# Patient Record
Sex: FEMALE | Race: WHITE | NOT HISPANIC OR LATINO | Employment: PART TIME | URBAN - METROPOLITAN AREA
[De-identification: names, ages, dates, MRNs, and addresses within clinical notes are randomized per-mention and may not be internally consistent; named-entity substitution may affect disease eponyms.]

---

## 2017-02-16 ENCOUNTER — HOSPITAL ENCOUNTER (EMERGENCY)
Facility: HOSPITAL | Age: 47
Discharge: HOME | End: 2017-02-16
Admitting: EMERGENCY MEDICINE
Payer: COMMERCIAL

## 2017-02-16 VITALS
OXYGEN SATURATION: 100 % | SYSTOLIC BLOOD PRESSURE: 140 MMHG | TEMPERATURE: 98.3 F | DIASTOLIC BLOOD PRESSURE: 84 MMHG | WEIGHT: 135 LBS | HEART RATE: 63 BPM | RESPIRATION RATE: 18 BRPM

## 2017-02-16 DIAGNOSIS — S61.411A LACERATION OF RIGHT HAND, INITIAL ENCOUNTER: Primary | ICD-10-CM

## 2017-02-16 PROCEDURE — 90471 IMMUNIZATION ADMIN: CPT

## 2017-02-16 PROCEDURE — 99282 EMERGENCY DEPT VISIT SF MDM: CPT

## 2017-02-16 PROCEDURE — 90715 TDAP VACCINE 7 YRS/> IM: CPT | Performed by: PHYSICIAN ASSISTANT

## 2017-02-16 RX ORDER — BACITRACIN, NEOMYCIN, POLYMYXIN B 400; 3.5; 5 [USP'U]/G; MG/G; [USP'U]/G
1 OINTMENT TOPICAL ONCE
Status: COMPLETED | OUTPATIENT
Start: 2017-02-16 | End: 2017-02-16

## 2017-02-16 RX ORDER — LIDOCAINE HYDROCHLORIDE 10 MG/ML
5 INJECTION, SOLUTION EPIDURAL; INFILTRATION; INTRACAUDAL; PERINEURAL ONCE
Status: COMPLETED | OUTPATIENT
Start: 2017-02-16 | End: 2017-02-16

## 2017-02-16 RX ADMIN — LIDOCAINE HYDROCHLORIDE 5 ML: 10 INJECTION, SOLUTION EPIDURAL; INFILTRATION; INTRACAUDAL; PERINEURAL at 06:59

## 2017-02-16 RX ADMIN — TETANUS TOXOID, REDUCED DIPHTHERIA TOXOID AND ACELLULAR PERTUSSIS VACCINE, ADSORBED 0.5 ML: 5; 2.5; 8; 8; 2.5 SUSPENSION INTRAMUSCULAR at 07:39

## 2017-02-16 RX ADMIN — BACITRACIN, NEOMYCIN, POLYMYXIN B 1 SMALL APPLICATION: 400; 3.5; 5 OINTMENT TOPICAL at 06:59

## 2017-03-11 ENCOUNTER — GENERIC CONVERSION - ENCOUNTER (OUTPATIENT)
Dept: OTHER | Facility: OTHER | Age: 47
End: 2017-03-11

## 2019-10-16 ENCOUNTER — TRANSCRIBE ORDERS (OUTPATIENT)
Dept: ADMINISTRATIVE | Facility: HOSPITAL | Age: 49
End: 2019-10-16

## 2019-10-16 DIAGNOSIS — M20.21 HALLUX RIGIDUS OF RIGHT FOOT: Primary | ICD-10-CM

## 2019-10-23 ENCOUNTER — HOSPITAL ENCOUNTER (OUTPATIENT)
Dept: MRI IMAGING | Facility: HOSPITAL | Age: 49
Discharge: HOME | End: 2019-10-23
Attending: PODIATRIST
Payer: COMMERCIAL

## 2019-10-23 DIAGNOSIS — M20.21 HALLUX RIGIDUS OF RIGHT FOOT: ICD-10-CM

## 2019-10-23 PROCEDURE — 73718 MRI LOWER EXTREMITY W/O DYE: CPT

## 2020-08-12 ENCOUNTER — OFFICE VISIT (OUTPATIENT)
Dept: PODIATRY | Facility: CLINIC | Age: 50
End: 2020-08-12
Payer: COMMERCIAL

## 2020-08-12 VITALS — BODY MASS INDEX: 20.16 KG/M2 | WEIGHT: 133 LBS | HEIGHT: 68 IN

## 2020-08-12 DIAGNOSIS — M19.071 PRIMARY OSTEOARTHRITIS OF RIGHT FOOT: ICD-10-CM

## 2020-08-12 DIAGNOSIS — M20.5X1 HALLUX LIMITUS OF RIGHT FOOT: Primary | ICD-10-CM

## 2020-08-12 PROCEDURE — 99203 OFFICE O/P NEW LOW 30 MIN: CPT | Performed by: PODIATRIST

## 2020-08-12 NOTE — PROGRESS NOTES
Assessment/Plan:     Diagnoses and all orders for this visit:    Hallux limitus of right foot  -     Case request operating room: CHEILECTOMY first MTPJ; Standing  -     Case request operating room: CHEILECTOMY first MTPJ    Primary osteoarthritis of right foot  -     Case request operating room: CHEILECTOMY first MTPJ; Standing  -     Case request operating room: CHEILECTOMY first MTPJ    Other orders  -     Incentive spirometry; Standing  -     Insert and maintain IV line; Standing  -     Void On-Call to O R ; Standing  -     ceFAZolin (ANCEF) 1,000 mg in dextrose 5 % 100 mL IVPB      patient has moderate osteoarthritis of the 1st metatarsophalangeal joint of the right foot  Conservative and surgical options discussed  Given the amount of pain and discomfort she has been having as well as the fact she has tried numerous conservative measures to relieve her pain I feel are best option moving forward would be surgical intervention  Described a cheilectomy procedure in detail and what it would entail  Reviewed her MRI findings showing a constrained joint with spurring and marrow edema  Alternatives, risks, complications discussed  Consent was signed for cheilectomy right 1st metatarsophalangeal joint  Postoperative course was discussed as can best be predicted  Patient should plan for 1 month off work but may be able to return sooner  Surgery is planned for early September  Subjective:      Patient ID: Lina Ponce is a 44 y o  female  Patient is a distance runner  She has never had a problem with her before  LAst summer she started getting pain at the base of the great toe on the right  She thought it may be a stress fracture  It would only hurt after an hour of running, around 7-8 miles  As soon as she stopped running it would HURT A LOT  IT only mildly swells  She has been trying to cut down on her miles  She saw a podiatrist who did not feel it was a stress fracture   She called it arthritis  She got an MRI which did NOT show a stress fracture either  The following portions of the patient's history were reviewed and updated as appropriate: allergies, current medications, past family history, past medical history, past social history, past surgical history and problem list     Review of Systems   Constitutional: Negative  HENT: Negative for sinus pressure and sinus pain  Respiratory: Negative for cough and shortness of breath  Cardiovascular: Negative for leg swelling  Gastrointestinal: Negative for diarrhea and nausea  Musculoskeletal: Positive for arthralgias and joint swelling  Negative for gait problem  Skin: Negative for color change, rash and wound  Neurological: Negative for numbness  Objective:      Ht 5' 8" (1 727 m)   Wt 60 3 kg (133 lb)   BMI 20 22 kg/m²          Physical Exam    Vitals reviewed    Constitutional: Patient is not distressed  Patient is well developed  Vascular: Dorsalis pedis and posterior tibial pulses palpable  Capillary refill time within normal limits to all digits  No erythema  No edema  No significant varicosities  Dermatology: No rash  No open lesions  Present pedal hair  Skin has healthy turgor  Musculoskeletal: Normal range of motion to ankle, subtalar joint, and midtarsal joint  Decreased range of motion first MTPJ  Functionally patient has less than 5° dorsiflexion during stance with moderate to severe spurring around the 1st metatarsophalangeal joint  Passively I find no crepitus with range of motion but again dorsiflexion is limited due to abutment of bone  Manual muscle testing 5 out of 5 for inversion/eversion/dorsiflexion/plantarflexion  On stance patients feet show moderate collapsing pes planovalgus without ankle or proceed tibial tendon pain  Mild calcaneal valgus noted on stance  Normal inversion during heel raise  Neurological exam: Monofilament sensation is intact  Vibratory sensation is intact  Achilles reflex is normal  Patient is AAOx3  MRI from last year shows moderate marrow edema to the first metatarsal head  Thinning joint space MTPJ first ray

## 2020-08-14 ENCOUNTER — APPOINTMENT (OUTPATIENT)
Dept: LAB | Facility: MEDICAL CENTER | Age: 50
End: 2020-08-14
Payer: COMMERCIAL

## 2020-08-14 DIAGNOSIS — Z01.818 PRE-OP TESTING: ICD-10-CM

## 2020-08-14 DIAGNOSIS — M20.5X1 HALLUX LIMITUS OF RIGHT FOOT: ICD-10-CM

## 2020-08-14 LAB
ANION GAP SERPL CALCULATED.3IONS-SCNC: 7 MMOL/L (ref 4–13)
BASOPHILS # BLD AUTO: 0.05 THOUSANDS/ΜL (ref 0–0.1)
BASOPHILS NFR BLD AUTO: 1 % (ref 0–1)
BUN SERPL-MCNC: 8 MG/DL (ref 5–25)
CALCIUM SERPL-MCNC: 9.1 MG/DL (ref 8.3–10.1)
CHLORIDE SERPL-SCNC: 106 MMOL/L (ref 100–108)
CO2 SERPL-SCNC: 28 MMOL/L (ref 21–32)
CREAT SERPL-MCNC: 0.73 MG/DL (ref 0.6–1.3)
EOSINOPHIL # BLD AUTO: 0.05 THOUSAND/ΜL (ref 0–0.61)
EOSINOPHIL NFR BLD AUTO: 1 % (ref 0–6)
ERYTHROCYTE [DISTWIDTH] IN BLOOD BY AUTOMATED COUNT: 13.2 % (ref 11.6–15.1)
GFR SERPL CREATININE-BSD FRML MDRD: 96 ML/MIN/1.73SQ M
GLUCOSE P FAST SERPL-MCNC: 88 MG/DL (ref 65–99)
HCT VFR BLD AUTO: 42.9 % (ref 34.8–46.1)
HGB BLD-MCNC: 13.8 G/DL (ref 11.5–15.4)
IMM GRANULOCYTES # BLD AUTO: 0.01 THOUSAND/UL (ref 0–0.2)
IMM GRANULOCYTES NFR BLD AUTO: 0 % (ref 0–2)
LYMPHOCYTES # BLD AUTO: 1.82 THOUSANDS/ΜL (ref 0.6–4.47)
LYMPHOCYTES NFR BLD AUTO: 44 % (ref 14–44)
MCH RBC QN AUTO: 32.3 PG (ref 26.8–34.3)
MCHC RBC AUTO-ENTMCNC: 32.2 G/DL (ref 31.4–37.4)
MCV RBC AUTO: 101 FL (ref 82–98)
MONOCYTES # BLD AUTO: 0.4 THOUSAND/ΜL (ref 0.17–1.22)
MONOCYTES NFR BLD AUTO: 10 % (ref 4–12)
NEUTROPHILS # BLD AUTO: 1.81 THOUSANDS/ΜL (ref 1.85–7.62)
NEUTS SEG NFR BLD AUTO: 44 % (ref 43–75)
NRBC BLD AUTO-RTO: 0 /100 WBCS
PLATELET # BLD AUTO: 251 THOUSANDS/UL (ref 149–390)
PMV BLD AUTO: 10.5 FL (ref 8.9–12.7)
POTASSIUM SERPL-SCNC: 3.9 MMOL/L (ref 3.5–5.3)
RBC # BLD AUTO: 4.27 MILLION/UL (ref 3.81–5.12)
SODIUM SERPL-SCNC: 141 MMOL/L (ref 136–145)
WBC # BLD AUTO: 4.14 THOUSAND/UL (ref 4.31–10.16)

## 2020-08-14 PROCEDURE — 85025 COMPLETE CBC W/AUTO DIFF WBC: CPT

## 2020-08-14 PROCEDURE — 36415 COLL VENOUS BLD VENIPUNCTURE: CPT

## 2020-08-14 PROCEDURE — 80048 BASIC METABOLIC PNL TOTAL CA: CPT

## 2020-08-19 ENCOUNTER — CONSULT (OUTPATIENT)
Dept: FAMILY MEDICINE CLINIC | Facility: CLINIC | Age: 50
End: 2020-08-19
Payer: COMMERCIAL

## 2020-08-19 VITALS
RESPIRATION RATE: 18 BRPM | WEIGHT: 131 LBS | OXYGEN SATURATION: 99 % | BODY MASS INDEX: 19.85 KG/M2 | TEMPERATURE: 98.7 F | HEART RATE: 65 BPM | DIASTOLIC BLOOD PRESSURE: 70 MMHG | HEIGHT: 68 IN | SYSTOLIC BLOOD PRESSURE: 130 MMHG

## 2020-08-19 DIAGNOSIS — Z01.818 PRE-OP EXAMINATION: Primary | ICD-10-CM

## 2020-08-19 PROCEDURE — 3725F SCREEN DEPRESSION PERFORMED: CPT | Performed by: FAMILY MEDICINE

## 2020-08-19 PROCEDURE — 99243 OFF/OP CNSLTJ NEW/EST LOW 30: CPT | Performed by: FAMILY MEDICINE

## 2020-08-19 PROCEDURE — 1036F TOBACCO NON-USER: CPT | Performed by: FAMILY MEDICINE

## 2020-08-19 PROCEDURE — 3008F BODY MASS INDEX DOCD: CPT | Performed by: FAMILY MEDICINE

## 2020-08-19 NOTE — H&P (VIEW-ONLY)
Assessment/Plan:  Patient is cleared for surgery  We also discussed mammography and colon cancer screening but these were deferred by patient  Lab testing was reviewed  EKG offered but deferred as well  Recommend annual testing  She is exercising regularly  No problem-specific Assessment & Plan notes found for this encounter  There are no diagnoses linked to this encounter  Subjective:      Patient ID: Celeste Jerome is a 44 y o  female  Patient is here for preoperative clearance for foot surgery  She is having foot surgery in the next few weeks  No prior difficulty with anesthesia  Currently on no medications  She had lab testing done that was normal       The following portions of the patient's history were reviewed and updated as appropriate: allergies, current medications, past family history, past medical history, past social history, past surgical history and problem list     Review of Systems   Constitutional: Negative  HENT: Negative  Eyes: Negative  Respiratory: Negative  Cardiovascular: Negative  Gastrointestinal: Negative  Endocrine: Negative  Genitourinary: Negative  Musculoskeletal: Positive for arthralgias ( foot pain)  Skin: Negative  Allergic/Immunologic: Negative  Neurological: Negative  Hematological: Negative  Psychiatric/Behavioral: Negative  Objective:      /70 (BP Location: Left arm, Patient Position: Sitting, Cuff Size: Adult)   Pulse 65   Temp 98 7 °F (37 1 °C)   Resp 18   Ht 5' 8" (1 727 m)   Wt 59 4 kg (131 lb)   SpO2 99%   BMI 19 92 kg/m²          Physical Exam  Vitals signs reviewed  Constitutional:       Appearance: She is well-developed  HENT:      Head: Normocephalic and atraumatic  Right Ear: External ear normal  Tympanic membrane is not erythematous or bulging  Left Ear: External ear normal  Tympanic membrane is not erythematous or bulging        Nose: Nose normal  Mouth/Throat:      Mouth: No oral lesions  Pharynx: No oropharyngeal exudate  Eyes:      General: No scleral icterus  Right eye: No discharge  Left eye: No discharge  Conjunctiva/sclera: Conjunctivae normal    Neck:      Musculoskeletal: Normal range of motion and neck supple  Thyroid: No thyromegaly  Cardiovascular:      Rate and Rhythm: Normal rate and regular rhythm  Heart sounds: Normal heart sounds  No murmur  No friction rub  No gallop  Pulmonary:      Effort: Pulmonary effort is normal  No respiratory distress  Breath sounds: No wheezing or rales  Chest:      Chest wall: No tenderness  Abdominal:      General: Bowel sounds are normal  There is no distension  Palpations: Abdomen is soft  There is no mass  Tenderness: There is no abdominal tenderness  There is no guarding or rebound  Musculoskeletal: Normal range of motion  General: No tenderness or deformity  Lymphadenopathy:      Cervical: No cervical adenopathy  Skin:     General: Skin is warm and dry  Coloration: Skin is not pale  Findings: No erythema or rash  Neurological:      Mental Status: She is alert and oriented to person, place, and time  Cranial Nerves: No cranial nerve deficit  Motor: No abnormal muscle tone  Coordination: Coordination normal       Deep Tendon Reflexes: Reflexes are normal and symmetric     Psychiatric:         Behavior: Behavior normal

## 2020-08-19 NOTE — PROGRESS NOTES
Assessment/Plan:  Patient is cleared for surgery  We also discussed mammography and colon cancer screening but these were deferred by patient  Lab testing was reviewed  EKG offered but deferred as well  Recommend annual testing  She is exercising regularly  No problem-specific Assessment & Plan notes found for this encounter  There are no diagnoses linked to this encounter  Subjective:      Patient ID: Abilio Maldonado is a 44 y o  female  Patient is here for preoperative clearance for foot surgery  She is having foot surgery in the next few weeks  No prior difficulty with anesthesia  Currently on no medications  She had lab testing done that was normal       The following portions of the patient's history were reviewed and updated as appropriate: allergies, current medications, past family history, past medical history, past social history, past surgical history and problem list     Review of Systems   Constitutional: Negative  HENT: Negative  Eyes: Negative  Respiratory: Negative  Cardiovascular: Negative  Gastrointestinal: Negative  Endocrine: Negative  Genitourinary: Negative  Musculoskeletal: Positive for arthralgias ( foot pain)  Skin: Negative  Allergic/Immunologic: Negative  Neurological: Negative  Hematological: Negative  Psychiatric/Behavioral: Negative  Objective:      /70 (BP Location: Left arm, Patient Position: Sitting, Cuff Size: Adult)   Pulse 65   Temp 98 7 °F (37 1 °C)   Resp 18   Ht 5' 8" (1 727 m)   Wt 59 4 kg (131 lb)   SpO2 99%   BMI 19 92 kg/m²          Physical Exam  Vitals signs reviewed  Constitutional:       Appearance: She is well-developed  HENT:      Head: Normocephalic and atraumatic  Right Ear: External ear normal  Tympanic membrane is not erythematous or bulging  Left Ear: External ear normal  Tympanic membrane is not erythematous or bulging        Nose: Nose normal  Mouth/Throat:      Mouth: No oral lesions  Pharynx: No oropharyngeal exudate  Eyes:      General: No scleral icterus  Right eye: No discharge  Left eye: No discharge  Conjunctiva/sclera: Conjunctivae normal    Neck:      Musculoskeletal: Normal range of motion and neck supple  Thyroid: No thyromegaly  Cardiovascular:      Rate and Rhythm: Normal rate and regular rhythm  Heart sounds: Normal heart sounds  No murmur  No friction rub  No gallop  Pulmonary:      Effort: Pulmonary effort is normal  No respiratory distress  Breath sounds: No wheezing or rales  Chest:      Chest wall: No tenderness  Abdominal:      General: Bowel sounds are normal  There is no distension  Palpations: Abdomen is soft  There is no mass  Tenderness: There is no abdominal tenderness  There is no guarding or rebound  Musculoskeletal: Normal range of motion  General: No tenderness or deformity  Lymphadenopathy:      Cervical: No cervical adenopathy  Skin:     General: Skin is warm and dry  Coloration: Skin is not pale  Findings: No erythema or rash  Neurological:      Mental Status: She is alert and oriented to person, place, and time  Cranial Nerves: No cranial nerve deficit  Motor: No abnormal muscle tone  Coordination: Coordination normal       Deep Tendon Reflexes: Reflexes are normal and symmetric     Psychiatric:         Behavior: Behavior normal

## 2020-08-21 ENCOUNTER — ANESTHESIA EVENT (OUTPATIENT)
Dept: PERIOP | Facility: AMBULARY SURGERY CENTER | Age: 50
End: 2020-08-21
Payer: COMMERCIAL

## 2020-09-01 NOTE — PRE-PROCEDURE INSTRUCTIONS
No outpatient medications have been marked as taking for the 9/3/20 encounter [facility] Encounter)  All pre-op instructions provided w/ pt verb understanding including showering instructions - pt takes no pre-op meds

## 2020-09-03 ENCOUNTER — APPOINTMENT (OUTPATIENT)
Dept: RADIOLOGY | Facility: AMBULARY SURGERY CENTER | Age: 50
End: 2020-09-03
Payer: COMMERCIAL

## 2020-09-03 ENCOUNTER — ANESTHESIA (OUTPATIENT)
Dept: PERIOP | Facility: AMBULARY SURGERY CENTER | Age: 50
End: 2020-09-03
Payer: COMMERCIAL

## 2020-09-03 ENCOUNTER — HOSPITAL ENCOUNTER (OUTPATIENT)
Facility: AMBULARY SURGERY CENTER | Age: 50
Setting detail: OUTPATIENT SURGERY
Discharge: HOME | End: 2020-09-03
Attending: PODIATRIST | Admitting: PODIATRIST
Payer: COMMERCIAL

## 2020-09-03 VITALS — HEART RATE: 50 BPM

## 2020-09-03 VITALS
WEIGHT: 128 LBS | OXYGEN SATURATION: 98 % | TEMPERATURE: 98 F | HEART RATE: 55 BPM | RESPIRATION RATE: 18 BRPM | BODY MASS INDEX: 19.4 KG/M2 | DIASTOLIC BLOOD PRESSURE: 71 MMHG | HEIGHT: 68 IN | SYSTOLIC BLOOD PRESSURE: 109 MMHG

## 2020-09-03 DIAGNOSIS — Z98.890 S/P FOOT SURGERY: Primary | ICD-10-CM

## 2020-09-03 LAB
EXT PREGNANCY TEST URINE: NEGATIVE
EXT. CONTROL: NORMAL

## 2020-09-03 PROCEDURE — 99024 POSTOP FOLLOW-UP VISIT: CPT | Performed by: PODIATRIST

## 2020-09-03 PROCEDURE — 81025 URINE PREGNANCY TEST: CPT | Performed by: ANESTHESIOLOGY

## 2020-09-03 PROCEDURE — 28289 CORRJ HALUX RIGDUS W/O IMPLT: CPT | Performed by: PODIATRIST

## 2020-09-03 PROCEDURE — 73630 X-RAY EXAM OF FOOT: CPT

## 2020-09-03 RX ORDER — SODIUM CHLORIDE, SODIUM LACTATE, POTASSIUM CHLORIDE, CALCIUM CHLORIDE 600; 310; 30; 20 MG/100ML; MG/100ML; MG/100ML; MG/100ML
20 INJECTION, SOLUTION INTRAVENOUS CONTINUOUS
Status: DISCONTINUED | OUTPATIENT
Start: 2020-09-03 | End: 2020-09-03 | Stop reason: HOSPADM

## 2020-09-03 RX ORDER — BUPIVACAINE HYDROCHLORIDE 5 MG/ML
INJECTION, SOLUTION PERINEURAL AS NEEDED
Status: DISCONTINUED | OUTPATIENT
Start: 2020-09-03 | End: 2020-09-03 | Stop reason: HOSPADM

## 2020-09-03 RX ORDER — PROPOFOL 10 MG/ML
INJECTION, EMULSION INTRAVENOUS AS NEEDED
Status: DISCONTINUED | OUTPATIENT
Start: 2020-09-03 | End: 2020-09-03

## 2020-09-03 RX ORDER — OXYCODONE HYDROCHLORIDE AND ACETAMINOPHEN 5; 325 MG/1; MG/1
1 TABLET ORAL EVERY 4 HOURS PRN
Qty: 15 TABLET | Refills: 0 | Status: SHIPPED | OUTPATIENT
Start: 2020-09-03 | End: 2020-09-08

## 2020-09-03 RX ORDER — CEFAZOLIN SODIUM 1 G/50ML
1000 SOLUTION INTRAVENOUS EVERY 8 HOURS
Status: DISCONTINUED | OUTPATIENT
Start: 2020-09-03 | End: 2020-09-03 | Stop reason: HOSPADM

## 2020-09-03 RX ORDER — MAGNESIUM HYDROXIDE 1200 MG/15ML
LIQUID ORAL AS NEEDED
Status: DISCONTINUED | OUTPATIENT
Start: 2020-09-03 | End: 2020-09-03 | Stop reason: HOSPADM

## 2020-09-03 RX ORDER — FENTANYL CITRATE 50 UG/ML
INJECTION, SOLUTION INTRAMUSCULAR; INTRAVENOUS AS NEEDED
Status: DISCONTINUED | OUTPATIENT
Start: 2020-09-03 | End: 2020-09-03

## 2020-09-03 RX ORDER — KETOROLAC TROMETHAMINE 30 MG/ML
30 INJECTION, SOLUTION INTRAMUSCULAR; INTRAVENOUS ONCE
Status: COMPLETED | OUTPATIENT
Start: 2020-09-03 | End: 2020-09-03

## 2020-09-03 RX ORDER — ACETAMINOPHEN 325 MG/1
650 TABLET ORAL EVERY 6 HOURS PRN
Status: DISCONTINUED | OUTPATIENT
Start: 2020-09-03 | End: 2020-09-03 | Stop reason: HOSPADM

## 2020-09-03 RX ORDER — DEXAMETHASONE SODIUM PHOSPHATE 4 MG/ML
INJECTION, SOLUTION INTRA-ARTICULAR; INTRALESIONAL; INTRAMUSCULAR; INTRAVENOUS; SOFT TISSUE AS NEEDED
Status: DISCONTINUED | OUTPATIENT
Start: 2020-09-03 | End: 2020-09-03

## 2020-09-03 RX ORDER — LIDOCAINE HYDROCHLORIDE 10 MG/ML
INJECTION, SOLUTION EPIDURAL; INFILTRATION; INTRACAUDAL; PERINEURAL AS NEEDED
Status: DISCONTINUED | OUTPATIENT
Start: 2020-09-03 | End: 2020-09-03 | Stop reason: HOSPADM

## 2020-09-03 RX ORDER — ONDANSETRON 2 MG/ML
4 INJECTION INTRAMUSCULAR; INTRAVENOUS ONCE AS NEEDED
Status: DISCONTINUED | OUTPATIENT
Start: 2020-09-03 | End: 2020-09-03 | Stop reason: HOSPADM

## 2020-09-03 RX ORDER — SODIUM CHLORIDE, SODIUM LACTATE, POTASSIUM CHLORIDE, CALCIUM CHLORIDE 600; 310; 30; 20 MG/100ML; MG/100ML; MG/100ML; MG/100ML
125 INJECTION, SOLUTION INTRAVENOUS CONTINUOUS
Status: DISCONTINUED | OUTPATIENT
Start: 2020-09-03 | End: 2020-09-03 | Stop reason: HOSPADM

## 2020-09-03 RX ORDER — ONDANSETRON 2 MG/ML
INJECTION INTRAMUSCULAR; INTRAVENOUS AS NEEDED
Status: DISCONTINUED | OUTPATIENT
Start: 2020-09-03 | End: 2020-09-03

## 2020-09-03 RX ORDER — MIDAZOLAM HYDROCHLORIDE 2 MG/2ML
INJECTION, SOLUTION INTRAMUSCULAR; INTRAVENOUS AS NEEDED
Status: DISCONTINUED | OUTPATIENT
Start: 2020-09-03 | End: 2020-09-03

## 2020-09-03 RX ORDER — FENTANYL CITRATE/PF 50 MCG/ML
25 SYRINGE (ML) INJECTION
Status: DISCONTINUED | OUTPATIENT
Start: 2020-09-03 | End: 2020-09-03 | Stop reason: HOSPADM

## 2020-09-03 RX ORDER — OXYCODONE HYDROCHLORIDE AND ACETAMINOPHEN 5; 325 MG/1; MG/1
1 TABLET ORAL EVERY 4 HOURS PRN
Status: DISCONTINUED | OUTPATIENT
Start: 2020-09-03 | End: 2020-09-03 | Stop reason: HOSPADM

## 2020-09-03 RX ORDER — PROPOFOL 10 MG/ML
INJECTION, EMULSION INTRAVENOUS CONTINUOUS PRN
Status: DISCONTINUED | OUTPATIENT
Start: 2020-09-03 | End: 2020-09-03

## 2020-09-03 RX ADMIN — PROPOFOL 80 MG: 10 INJECTION, EMULSION INTRAVENOUS at 10:53

## 2020-09-03 RX ADMIN — FENTANYL CITRATE 50 MCG: 50 INJECTION, SOLUTION INTRAMUSCULAR; INTRAVENOUS at 10:53

## 2020-09-03 RX ADMIN — PROPOFOL 75 MCG/KG/MIN: 10 INJECTION, EMULSION INTRAVENOUS at 10:53

## 2020-09-03 RX ADMIN — MIDAZOLAM HYDROCHLORIDE 2 MG: 1 INJECTION, SOLUTION INTRAMUSCULAR; INTRAVENOUS at 10:56

## 2020-09-03 RX ADMIN — ACETAMINOPHEN 650 MG: 325 TABLET, FILM COATED ORAL at 12:00

## 2020-09-03 RX ADMIN — SODIUM CHLORIDE, SODIUM LACTATE, POTASSIUM CHLORIDE, AND CALCIUM CHLORIDE: .6; .31; .03; .02 INJECTION, SOLUTION INTRAVENOUS at 10:38

## 2020-09-03 RX ADMIN — DEXAMETHASONE SODIUM PHOSPHATE 4 MG: 4 INJECTION, SOLUTION INTRAMUSCULAR; INTRAVENOUS at 10:53

## 2020-09-03 RX ADMIN — FENTANYL CITRATE 25 MCG: 50 INJECTION, SOLUTION INTRAMUSCULAR; INTRAVENOUS at 11:05

## 2020-09-03 RX ADMIN — KETOROLAC TROMETHAMINE 30 MG: 30 INJECTION, SOLUTION INTRAMUSCULAR at 12:19

## 2020-09-03 RX ADMIN — ONDANSETRON 4 MG: 2 INJECTION INTRAMUSCULAR; INTRAVENOUS at 10:56

## 2020-09-03 RX ADMIN — FENTANYL CITRATE 25 MCG: 50 INJECTION, SOLUTION INTRAMUSCULAR; INTRAVENOUS at 10:59

## 2020-09-03 RX ADMIN — CEFAZOLIN SODIUM 1000 MG: 1 SOLUTION INTRAVENOUS at 10:45

## 2020-09-03 NOTE — OP NOTE
OPERATIVE REPORT  PATIENT NAME: Edilma Kendrick    :  1970  MRN: [id number]  Pt Location: [facility] OR ROOM 04    SURGERY DATE: 9/3/2020    Surgeon(s) and Role:     * Garland Bence, DPM - Primary     * Royal Davis DPM - Assisting    Preop Diagnosis:  Hallux limitus of right foot [M20 5X1]  Primary osteoarthritis of right foot [M19 071]    Post-Op Diagnosis Codes:     * Hallux limitus of right foot [M20 5X1]     * Primary osteoarthritis of right foot [M19 071]    Procedure(s) (LRB):  CHEILECTOMY first MTPJ (Right)    Specimen(s):  * No specimens in log *    Estimated Blood Loss:   Minimal    Drains:  * No LDAs found *    Anesthesia Type:   IV Sedation with Anesthesia    Operative Indications:  Hallux limitus of right foot [M20 5X1]  Primary osteoarthritis of right foot [M19 071]      Operative Findings:  Consistent with diagnosis  There is a small 0 5 x 0 5 cm area on the dorsal lateral corner of the metatarsal head where the cartilage was found to be denuded  Complications:   None    Procedure and Technique:  Under mild sedation the patient was brought into the operating room and placed on the operating table in a supine position  Pneumatic ankle tourniquet was placed about the right ankle  Following IV sedation a time-out was performed  10 cc of local anesthetic was infiltrated in a Melendez block fashion about the right 1st ray  The right foot was then scrubbed prepped draped in the usual aseptic manner  The tourniquet was inflated to 250 mm of pressure    Attention was then directed to the 1st ray where a linear incision was made dorsal to the 1st metatarsophalangeal joint approximately 5 cm  Sharp and blunt dissection continued down to the level of the capsule with care being taken to retract all vital neurovascular structures  Any small veins were cauterized    A linear dorsal incision was made to open the metatarsophalangeal joint capsule and all soft tissue attachments reflected off the metatarsal head and base of proximal phalanx  The joint was inspected  There was a small 0 5 x 0 5 cm area on the dorsal lateral aspect of the metatarsal head were cartilage was denuded  There was an even smaller lesion at the same area of the base of the proximal phalanx  This was cleaned but did not represent a significant amount of the articular surface  There was extensive amount of joint spurring dorsally laterally and medially of both metatarsal head and proximal phalanx  A sagittal saw was used to resect all the spurring off the metatarsal head  A rongeur and saw were used to resect any spurring from the base of the proximal phalanx  Following removal of the spurring the toe had anatomic range of motion of at least 60° dorsiflexion and 15° plantar flexion  The joint was rinsed with copious amounts normal sterile saline  The joint capsule was repaired with 3 0 Vicryl  Subcu closure with 4 O Vicryl  The skin was repaired with 4 O nylon  The foot was then cleaned and dressed  The tourniquet was deflated  A prompt hyperemic response was noted to all digits on the right foot  The patient emerged from anesthesia having tolerated the procedure well  She was transferred to PACU vital signs stable     I was present for the entire procedure    Patient Disposition:  PACU  and hemodynamically stable    SIGNATURE: Rica Blue DPM  DATE: September 3, 2020  TIME: 11:23 AM

## 2020-09-03 NOTE — INTERVAL H&P NOTE
H&P reviewed  After examining the patient I find no changes in the patients condition since the H&P had been written      Vitals:    09/03/20 1016   BP: 135/72   Pulse: 80   Resp: 18   Temp: 98 °F (36 7 °C)   SpO2: 98%

## 2020-09-03 NOTE — ANESTHESIA POSTPROCEDURE EVALUATION
Post-Op Assessment Note    CV Status:  Stable  Pain Score: 0    Pain management: adequate     Mental Status:  Alert   Hydration Status:  Stable and euvolemic   PONV Controlled:  None   Airway Patency:  Patent      Post Op Vitals Reviewed: Yes      Staff: CRNA   Comments: vss- report to PACU  RN        No complications documented      BP   102/58   Temp 97 3   Pulse 48   Resp 18   SpO2 99

## 2020-09-03 NOTE — DISCHARGE SUMMARY
Discharge Summary Outpatient Procedure Podiatry -   Ritesh Crisostomo 44 y o  female MRN: [id number]  Unit/Bed#: [facility] Encounter: [id number]    Admission Date: 9/3/2020     Admitting Diagnosis: Hallux limitus of right foot [M20 5X1]  Primary osteoarthritis of right foot [M19 071]    Discharge Diagnosis: same    Procedures Performed: CHEILECTOMY first MTPJ: 17557 (CPT®)    Complications: none    Condition at Discharge: stable    Discharge instructions/Information to patient and family:   See after visit summary for information provided to patient and family  Provisions for Follow-Up Care/Important appointments:  See after visit summary for information related to follow-up care and any pertinent home health orders  Discharge Medications:  See after visit summary for reconciled discharge medications provided to patient and family

## 2020-09-03 NOTE — DISCHARGE INSTRUCTIONS
Dr El Callejas Instructions    1  Take your prescribed medication as directed  2  Upon arrival at home, lie down and elevate your surgical foot on 2 pillows  3  Stay off your feet as much as possible for the first 24-48 hours  This is when your feet first swell and may become painful  After 48 hours you may begin limited walking following these restrictions:   Weight-bearing as tolerated  4  Drink large quantities of water  Consume no alcohol  Continue a well-balanced diet  5  Report any unusual discomfort or fever to this office  6  A limited amount of discomfort and swelling is to be expected  In some cases the skin may take on a bruised appearance  The surgical cleansing solution that was applied to your foot prior to the operation is dark in color and the operation site may appear to be oozing when it actually is not  7  A slight amount of blood is to be expected, and is no cause for alarm  Do not remove the dressings  If there is active bleeding and if the bleeding persists, add additional gauze to the bandage, apply direct pressure, elevate your feet and call this office  8  Do not get the dressings wet  As regular bathing may be inconvenient, sponge baths are recommended  9  When anesthesia wears off and if any discomfort should be present, apply an ice pack directly over the operated area for 15 minute intervals for several hours or until the pain leaves  (USE IN EXCESS OF 15 MINUTES COULD CAUSE FROSTBITE)  Do not use hot water bags or electric pads  A convenient icepack can be made by placing ice cubes in a plastic bag and covering this with a towel  10  Take over-the-counter laxative for constipation, this is common with use of narcotic medications

## 2020-09-03 NOTE — ANESTHESIA PREPROCEDURE EVALUATION
Procedure:  CHEILECTOMY first MTPJ (Right Foot)    Relevant Problems   ANESTHESIA (within normal limits)      CARDIO (within normal limits)      ENDO (within normal limits)      GI/HEPATIC (within normal limits)      /RENAL (within normal limits)      GYN (within normal limits)      HEMATOLOGY (within normal limits)      MUSCULOSKELETAL (within normal limits)      NEURO/PSYCH (within normal limits)      PULMONARY (within normal limits)        Physical Exam    Airway    Mallampati score: III  TM Distance: >3 FB  Neck ROM: full     Dental   No notable dental hx     Cardiovascular      Pulmonary      Other Findings        Anesthesia Plan  ASA Score- 1     Anesthesia Type- IV sedation with anesthesia with ASA Monitors  Additional Monitors:   Airway Plan:           Plan Factors-    Chart reviewed  Existing labs reviewed  Patient summary reviewed  Induction- intravenous  Postoperative Plan- Plan for postoperative opioid use  Informed Consent- Anesthetic plan and risks discussed with patient  I personally reviewed this patient with the CRNA  Discussed and agreed on the Anesthesia Plan with the CRNA  April De Souza

## 2020-09-09 ENCOUNTER — OFFICE VISIT (OUTPATIENT)
Dept: PODIATRY | Facility: CLINIC | Age: 50
End: 2020-09-09

## 2020-09-09 VITALS — HEIGHT: 68 IN | WEIGHT: 128 LBS | BODY MASS INDEX: 19.4 KG/M2

## 2020-09-09 DIAGNOSIS — M20.5X1 HALLUX LIMITUS OF RIGHT FOOT: Primary | ICD-10-CM

## 2020-09-09 DIAGNOSIS — Z09 POSTOP CHECK: ICD-10-CM

## 2020-09-09 PROCEDURE — 99024 POSTOP FOLLOW-UP VISIT: CPT | Performed by: PODIATRIST

## 2020-09-09 NOTE — LETTER
September 9, 2020     Patient: Janet Mayberry   YOB: 1970   Date of Visit: 9/9/2020       To Whom it May Concern:    Skye Nation is under my professional care  She was seen in my office on 9/9/2020  She  Had a surgical procedure on her right foot 9/3/2020  She will be out of work for 2-4 weeks postop  I will re-evaluate in 1 week (which is 2 weeks postop), to determine if she can return in 2 or 4 weeks  If you have any questions or concerns, please don't hesitate to call  Sincerely,          Rica Blue DPM        CC: Stepan Mayo

## 2020-09-09 NOTE — PROGRESS NOTES
Assessment/Plan:      Diagnoses and all orders for this visit:    Hallux limitus of right foot    Postop check       Patient recovering well  Incision is stable  Suture removal in 1 week  Weight bear in surgical shoe  Daily passive range of motion exercises discussed with patient  Subjective:     Patient ID: Edilma Kendrick is a 44 y o  female  Patient is 1 week status post cheilectomy of her right great toe  She is feeling well  Minimal pain today  Dressing is clean dry and intact  Review of Systems   Constitutional: Negative  Musculoskeletal: Positive for arthralgias  Objective:     Physical Exam      Right lower extremities exam shows neurovascular status intact to the right foot  Incision stable without sign of infection  Expected postoperative ecchymosis and edema  No sign of infection  Extensor and flexor hallucis longus intact and palpated  No crepitus with 1st MTPJ passive range of motion

## 2020-10-14 ENCOUNTER — OFFICE VISIT (OUTPATIENT)
Dept: PODIATRY | Facility: CLINIC | Age: 50
End: 2020-10-14

## 2020-10-14 VITALS — BODY MASS INDEX: 19.4 KG/M2 | HEIGHT: 68 IN | WEIGHT: 128 LBS

## 2020-10-14 DIAGNOSIS — Z09 POSTOP CHECK: ICD-10-CM

## 2020-10-14 DIAGNOSIS — M20.5X1 HALLUX LIMITUS OF RIGHT FOOT: Primary | ICD-10-CM

## 2020-10-14 PROCEDURE — 99024 POSTOP FOLLOW-UP VISIT: CPT | Performed by: PODIATRIST

## 2020-12-02 ENCOUNTER — OFFICE VISIT (OUTPATIENT)
Dept: PODIATRY | Facility: CLINIC | Age: 50
End: 2020-12-02

## 2020-12-02 VITALS — WEIGHT: 126 LBS | HEIGHT: 68 IN | BODY MASS INDEX: 19.1 KG/M2

## 2020-12-02 DIAGNOSIS — M20.5X1 HALLUX LIMITUS OF RIGHT FOOT: Primary | ICD-10-CM

## 2020-12-02 DIAGNOSIS — Z09 POSTOP CHECK: ICD-10-CM

## 2020-12-02 PROCEDURE — 99024 POSTOP FOLLOW-UP VISIT: CPT | Performed by: PODIATRIST

## 2021-02-18 DIAGNOSIS — Z23 ENCOUNTER FOR IMMUNIZATION: ICD-10-CM

## 2021-02-20 ENCOUNTER — IMMUNIZATIONS (OUTPATIENT)
Dept: FAMILY MEDICINE CLINIC | Facility: HOSPITAL | Age: 51
End: 2021-02-20

## 2021-02-20 DIAGNOSIS — Z23 ENCOUNTER FOR IMMUNIZATION: Primary | ICD-10-CM

## 2021-02-20 PROCEDURE — 91300 SARS-COV-2 / COVID-19 MRNA VACCINE (PFIZER-BIONTECH) 30 MCG: CPT

## 2021-02-20 PROCEDURE — 0001A SARS-COV-2 / COVID-19 MRNA VACCINE (PFIZER-BIONTECH) 30 MCG: CPT

## 2021-03-12 ENCOUNTER — IMMUNIZATIONS (OUTPATIENT)
Dept: FAMILY MEDICINE CLINIC | Facility: HOSPITAL | Age: 51
End: 2021-03-12

## 2021-03-12 DIAGNOSIS — Z23 ENCOUNTER FOR IMMUNIZATION: Primary | ICD-10-CM

## 2021-03-12 PROCEDURE — 91300 SARS-COV-2 / COVID-19 MRNA VACCINE (PFIZER-BIONTECH) 30 MCG: CPT

## 2021-03-12 PROCEDURE — 0002A SARS-COV-2 / COVID-19 MRNA VACCINE (PFIZER-BIONTECH) 30 MCG: CPT

## 2021-04-28 ENCOUNTER — OFFICE VISIT (OUTPATIENT)
Dept: PODIATRY | Facility: CLINIC | Age: 51
End: 2021-04-28
Payer: COMMERCIAL

## 2021-04-28 ENCOUNTER — HOSPITAL ENCOUNTER (OUTPATIENT)
Dept: RADIOLOGY | Facility: HOSPITAL | Age: 51
Discharge: HOME | End: 2021-04-28
Attending: PODIATRIST
Payer: COMMERCIAL

## 2021-04-28 VITALS
SYSTOLIC BLOOD PRESSURE: 110 MMHG | WEIGHT: 134.4 LBS | HEIGHT: 68 IN | DIASTOLIC BLOOD PRESSURE: 98 MMHG | BODY MASS INDEX: 20.37 KG/M2

## 2021-04-28 DIAGNOSIS — M19.071 PRIMARY OSTEOARTHRITIS OF RIGHT FOOT: ICD-10-CM

## 2021-04-28 DIAGNOSIS — M20.5X1 HALLUX LIMITUS OF RIGHT FOOT: ICD-10-CM

## 2021-04-28 DIAGNOSIS — M20.5X1 HALLUX LIMITUS OF RIGHT FOOT: Primary | ICD-10-CM

## 2021-04-28 PROCEDURE — 3008F BODY MASS INDEX DOCD: CPT | Performed by: PODIATRIST

## 2021-04-28 PROCEDURE — 73630 X-RAY EXAM OF FOOT: CPT

## 2021-04-28 PROCEDURE — 99213 OFFICE O/P EST LOW 20 MIN: CPT | Performed by: PODIATRIST

## 2021-04-28 PROCEDURE — 1036F TOBACCO NON-USER: CPT | Performed by: PODIATRIST

## 2021-04-28 NOTE — PROGRESS NOTES
Assessment/Plan:         Diagnoses and all orders for this visit:    Hallux limitus of right foot  -     X-ray foot right 3+ views; Future  -     Ambulatory referral to Physical Therapy; Future    Primary osteoarthritis of right foot  -     X-ray foot right 3+ views; Future  -     Ambulatory referral to Physical Therapy; Future      op note reviewed from previous surgery  I did note at the time of surgery there was cartilage damage  Patient is having some crepitus in the joint which may represent continued arthritic changes to the joint  This was a known possibility from the condition she had  I suspect the joint continues to degenerate  Recommended a new x-ray to compare to well previous x-ray  We discussed options of custom orthotics which she declined as she has tried these numerous times in the past   We discussed benign neglect and living with this issue  Surgical option would include fusion of the great toe joint versus implant  Given her age and high level of activity I would suggest fusion of the joint as it is more definitive and would still allow her to hike and be active  She would like to consider these options  She is going to get an x-ray later today  She will make contact with me after the x-ray we can further discuss her options moving forward  ADDENDUM: Patient left our visit this am and got a new XRay  The joint space appears anatomic, I don't see much concern radiographically  She may just have some postop scarring and synovitis  Recommend we try formal PT before doing any aggressive joint surgery  Also recommended she purchase Spenco arch supports  Subjective:      Patient ID: Sandi Morales is a 44 y o  female  Patient had cheilectomy procedure to her great toe last September 2020  During the surgery was noted that there was some cartilage damage to the metatarsal head which was micro fractured    Patient states she does still get achiness in the great toe joint which seems to be every day  She states the pain is not severe but she thinks about all the time because she is so active and just gets aches in the joint  Her range of motion has returned digit the toe does not feel stiff but any stress to the joint feels like something is grinding in her toe  She denies any new trauma  She denies any changes in medical history  The following portions of the patient's history were reviewed and updated as appropriate:   She  has a past medical history of Heel spur  She There are no active problems to display for this patient  She  has a past surgical history that includes McGrath tooth extraction; EAR SURGERY; Heel spur surgery; and pr hallux rigidus w/cheilectomy 1st mp jt w/o implt (Right, 9/3/2020)  Her family history is not on file  She  reports that she has never smoked  She has never used smokeless tobacco  She reports current alcohol use of about 2 0 standard drinks of alcohol per week  She reports that she does not use drugs  No current outpatient medications on file  No current facility-administered medications for this visit  No current outpatient medications on file prior to visit  No current facility-administered medications on file prior to visit  She has No Known Allergies       Review of Systems   Constitutional: Negative  Gastrointestinal: Negative for diarrhea, nausea and vomiting  Musculoskeletal: Positive for arthralgias and joint swelling  Skin: Negative for color change and wound  Neurological: Negative for weakness and numbness  Objective:      /98   Ht 5' 8" (1 727 m) Comment: verbal  Wt 61 kg (134 lb 6 4 oz)   BMI 20 44 kg/m²          Physical Exam  Vitals signs reviewed  Constitutional:       General: She is not in acute distress  Appearance: She is normal weight  She is not toxic-appearing  Cardiovascular:      Pulses:           Dorsalis pedis pulses are 2+ on the right side          Posterior tibial pulses are 2+ on the right side  Musculoskeletal:      Right foot: Normal range of motion  Deformity (There is mild to moderate crepitus in the metatarsophalangeal joint of the great toe with stress dorsiflexion ) present  No foot drop or prominent metatarsal heads  Feet:      Right foot:      Protective Sensation: 10 sites tested  10 sites sensed  Skin integrity: No ulcer, skin breakdown or erythema  Neurological:      Mental Status: She is alert

## 2021-05-05 ENCOUNTER — TELEPHONE (OUTPATIENT)
Dept: PODIATRY | Facility: CLINIC | Age: 51
End: 2021-05-05

## 2021-05-12 ENCOUNTER — EVALUATION (OUTPATIENT)
Dept: PHYSICAL THERAPY | Facility: CLINIC | Age: 51
End: 2021-05-12
Payer: COMMERCIAL

## 2021-05-12 DIAGNOSIS — M20.5X1 HALLUX LIMITUS OF RIGHT FOOT: Primary | ICD-10-CM

## 2021-05-12 DIAGNOSIS — M19.071 PRIMARY OSTEOARTHRITIS OF RIGHT FOOT: ICD-10-CM

## 2021-05-12 PROCEDURE — 97162 PT EVAL MOD COMPLEX 30 MIN: CPT | Performed by: PHYSICAL THERAPIST

## 2021-05-12 NOTE — PROGRESS NOTES
PT Evaluation     Today's date: 2021  Patient name: Andrew Montiel  : 1970  MRN: [id number]  Referring provider: ADOLFO Meléndez  Dx:   Encounter Diagnosis     ICD-10-CM    1  Hallux limitus of right foot  M20 5X1 Ambulatory referral to Physical Therapy   2  Primary osteoarthritis of right foot  M19 071 Ambulatory referral to Physical Therapy                  Assessment  Assessment details: Andrew Montiel is a 44y o  year old female presenting to PT with pain, decreased range of motion, decreased strength, and decreased tolerance to activity  Signs and symptoms are consistent with referring diagnosis of s/p cheilectomy in 2020  The existing impairments result in inability to return to running  She is noted with limited extension and irritation of distal plantar fascia  Mobility is improved following manual therapy in clinic  Union Aime would benefit from skilled PT services to address these issues and to maximize function  Home exercise provided and all questions answered  Thank you for the referral     Impairments: abnormal or restricted ROM, lacks appropriate home exercise program and pain with function  Understanding of Dx/Px/POC: good   Prognosis: good    Goals  STG (2-4 weeks)  Independent with HEP  Pain <5/10  Independent with all core and stability exercises without form deficits  ROM WFL all planes in BUE and BLE    LTG  (discharge)  Return to running without acute pain generated by activity  BLE strength within 20% contralateral LE  Independent with dynamic warm up and soft tissue self care        Plan  Planned therapy interventions: manual therapy, joint mobilization and neuromuscular re-education  Frequency: 2x week  Duration in weeks: 6        Subjective Evaluation    History of Present Illness  Date of surgery: 9/3/2020  Mechanism of injury: Linda reports long history of being an active runner, with onset of great toe pain on RLE about 2 years ago  Ultimately, this resulted in bone spur removal last year, with no other significant findings  She has since returned to all her usual daily home/work activities without limitation, but has been limited by pain for running and extended hiking  She has been able to continue strengthening but hopes to ultimately return to running  Quality of life: good    Pain  Current pain ratin  At best pain ratin  At worst pain ratin  Location: 1 met head MTP  Quality: sharp  Relieving factors: rest  Aggravating factors: running    Treatments  Treatments tried: surgery  Current treatment: physical therapy  Patient Goals  Patient goals for therapy: decreased pain, return to sport/leisure activities and increased motion          Objective     Tenderness     Right Ankle/Foot   Tenderness in the first metatarsal head and plantar fascia (distal)       Passive Range of Motion   Left Ankle/Foot    Great toe flexion: 60 degrees   Great toe extension: 40 degrees     Right Ankle/Foot    Great toe flexion: 20 degrees   Great toe extension: 17 degrees     Strength/Myotome Testing     Left Ankle/Foot   Great toe extension: 4+    Right Ankle/Foot   Great toe extension: 4-    Additional Strength Details  Gross hip strength 4-/5             Precautions: na      Manuals             1 met mobs,  Distraction, ext 15'            IASTM plantar fascia, 1met  5'                                      Neuro Re-Ed             Toe yoga 10x            Toe splay 10x            Arch dome +TB nv            Hallux ext 1/2 roll + squat nv                                                   Ther Ex                                                                                                                     Ther Activity                                       Gait Training                                       Modalities

## 2021-05-18 ENCOUNTER — OFFICE VISIT (OUTPATIENT)
Dept: PHYSICAL THERAPY | Facility: CLINIC | Age: 51
End: 2021-05-18
Payer: COMMERCIAL

## 2021-05-18 DIAGNOSIS — M20.5X1 HALLUX LIMITUS OF RIGHT FOOT: Primary | ICD-10-CM

## 2021-05-18 DIAGNOSIS — M19.071 PRIMARY OSTEOARTHRITIS OF RIGHT FOOT: ICD-10-CM

## 2021-05-18 PROCEDURE — 97112 NEUROMUSCULAR REEDUCATION: CPT | Performed by: PHYSICAL THERAPIST

## 2021-05-18 NOTE — PROGRESS NOTES
Daily Note     Today's date: 2021  Patient name: Krish Knight  : 1970  MRN: [id number]  Referring provider: ADOLFO Hancock  Dx:   Encounter Diagnosis     ICD-10-CM    1  Hallux limitus of right foot  M20 5X1    2  Primary osteoarthritis of right foot  M19 071                   Subjective: Massiel William has been consistent with all HEP provided  She does notice challenge with hip strengthening  She was able to participate in 8 mile hike over the weekend,  But does not notice significant change in her pain  Levels  Objective: See treatment diary below      Assessment: Tolerated treatment well and focused on end range oscillations of R hallux, IASTM plantar fascia and STM + MWM of medial calf and Achilles on the RLE  She is noted with increased tenderness by end of session, but increased mobility in both flexion and extension  Patient would benefit from continued PT      Plan: Continue per plan of care  Progress treatment as tolerated         Precautions: na      Manuals            1 met mobs,  Distraction, ext 15' 10           IASTM plantar fascia, 1met  5' 10           Medial calf RLE  10'                        Neuro Re-Ed             Toe yoga 10x            Toe splay 10x            Arch dome +TB nv            Hallux ext 1/2 roll + squat nv                                                   Ther Ex                                                                                                                     Ther Activity                                       Gait Training                                       Modalities

## 2021-05-21 ENCOUNTER — OFFICE VISIT (OUTPATIENT)
Dept: PHYSICAL THERAPY | Facility: CLINIC | Age: 51
End: 2021-05-21
Payer: COMMERCIAL

## 2021-05-21 DIAGNOSIS — M19.071 PRIMARY OSTEOARTHRITIS OF RIGHT FOOT: ICD-10-CM

## 2021-05-21 DIAGNOSIS — M20.5X1 HALLUX LIMITUS OF RIGHT FOOT: Primary | ICD-10-CM

## 2021-05-21 PROCEDURE — 97112 NEUROMUSCULAR REEDUCATION: CPT | Performed by: PHYSICAL THERAPIST

## 2021-05-21 NOTE — PROGRESS NOTES
Daily Note     Today's date: 2021  Patient name: Js Bess  : 1970  MRN: [id number]  Referring provider: ADOLFO Ramírez  Dx:   Encounter Diagnosis     ICD-10-CM    1  Hallux limitus of right foot  M20 5X1    2  Primary osteoarthritis of right foot  M19 071                   Subjective: Linda notices more "stretching" sensation with hallux extension, generalzed pain is decreasing and becoming more localized  Objective: See treatment diary below      Assessment: Tolerated treatment well and continued focus on MT to plantar fascia, medial calf and hallux mobs  Patient demonstrated fatigue post treatment and would benefit from continued PT      Plan: Continue per plan of care  Progress treatment as tolerated         Precautions: na      Manuals           1 met mobs,  Distraction, ext 15' 10 10          IASTM plantar fascia, 1met  5' 10 10          Medial calf RLE  10' 10                       Neuro Re-Ed             Toe yoga 10x            Toe splay 10x            Arch dome +TB nv            Hallux ext 1/2 roll + squat nv                                                   Ther Ex                                                                                                                     Ther Activity                                       Gait Training                                       Modalities

## 2021-05-26 ENCOUNTER — OFFICE VISIT (OUTPATIENT)
Dept: PHYSICAL THERAPY | Facility: CLINIC | Age: 51
End: 2021-05-26
Payer: COMMERCIAL

## 2021-05-26 DIAGNOSIS — M20.5X1 HALLUX LIMITUS OF RIGHT FOOT: Primary | ICD-10-CM

## 2021-05-26 DIAGNOSIS — M19.071 PRIMARY OSTEOARTHRITIS OF RIGHT FOOT: ICD-10-CM

## 2021-05-26 PROCEDURE — 97112 NEUROMUSCULAR REEDUCATION: CPT | Performed by: PHYSICAL THERAPIST

## 2021-05-26 NOTE — PROGRESS NOTES
Daily Note     Today's date: 2021  Patient name: Abilio Maldonado  : 1970  MRN: [id number]  Referring provider: ADOLFO Earl  Dx:   Encounter Diagnosis     ICD-10-CM    1  Hallux limitus of right foot  M20 5X1    2  Primary osteoarthritis of right foot  M19 071                   Subjective: Linda notes improvement in foot mobility and activity tolerance  Objective: See treatment diary below      Assessment: Tolerated treatment well and is noted with decreased tissue irritability, improved 1 met mobility and hallux extension  Less tension also noted in plantar fascia and medial calf  Patient would benefit from continued PT      Plan: Continue per plan of care  Progress treatment as tolerated         Precautions: na      Manuals          1 met mobs,  Distraction, ext 15' 10 10 10           IASTM plantar fascia, 1met  5' 10 10 10           Medial calf RLE  10' 10 10                      Neuro Re-Ed             Toe yoga 10x            Toe splay 10x            Arch dome +TB nv            Hallux ext 1/2 roll + squat nv            Functional HR    15x         SL hop    15x                      Ther Ex                                                                                                                     Ther Activity                                       Gait Training                                       Modalities

## 2021-05-28 ENCOUNTER — OFFICE VISIT (OUTPATIENT)
Dept: PHYSICAL THERAPY | Facility: CLINIC | Age: 51
End: 2021-05-28
Payer: COMMERCIAL

## 2021-05-28 DIAGNOSIS — M19.071 PRIMARY OSTEOARTHRITIS OF RIGHT FOOT: ICD-10-CM

## 2021-05-28 DIAGNOSIS — M20.5X1 HALLUX LIMITUS OF RIGHT FOOT: Primary | ICD-10-CM

## 2021-05-28 PROCEDURE — 97112 NEUROMUSCULAR REEDUCATION: CPT | Performed by: PHYSICAL THERAPIST

## 2021-05-28 NOTE — PROGRESS NOTES
Daily Note     Today's date: 2021  Patient name: Thompson Plasencia  : 1970  MRN: [id number]  Referring provider: ADOLFO Estrella  Dx:   Encounter Diagnosis     ICD-10-CM    1  Hallux limitus of right foot  M20 5X1    2  Primary osteoarthritis of right foot  M19 071                   Subjective: Linda notes some increased foot/toe soreness with progression to functional march and single leg hops  Objective: See treatment diary below      Assessment: Tolerated treatment well and foot mobility continues to improve  Included running specific single leg drills today to work on single limb strength and balance  Patient demonstrated fatigue post treatment and would benefit from continued PT      Plan: Continue per plan of care  Progress treatment as tolerated         Precautions: na      Manuals         1 met mobs,  Distraction, ext 15' 10 10 10   10        IASTM plantar fascia, 1met  5' 10 10 10   10        Medial calf RLE  10' 10 10 10                     Neuro Re-Ed             Toe yoga 10x            Toe splay 10x            Arch dome +TB nv            Hallux ext 1/2 roll + squat nv            Functional HR    15x         SL hop    15x         Banded functional march+HR     30x        Ther Ex                                                                                                                     Ther Activity                                       Gait Training                                       Modalities

## 2021-06-01 ENCOUNTER — APPOINTMENT (OUTPATIENT)
Dept: PHYSICAL THERAPY | Facility: CLINIC | Age: 51
End: 2021-06-01
Payer: COMMERCIAL

## 2021-06-04 ENCOUNTER — OFFICE VISIT (OUTPATIENT)
Dept: PHYSICAL THERAPY | Facility: CLINIC | Age: 51
End: 2021-06-04
Payer: COMMERCIAL

## 2021-06-04 DIAGNOSIS — M20.5X1 HALLUX LIMITUS OF RIGHT FOOT: Primary | ICD-10-CM

## 2021-06-04 DIAGNOSIS — M19.071 PRIMARY OSTEOARTHRITIS OF RIGHT FOOT: ICD-10-CM

## 2021-06-04 PROCEDURE — 97112 NEUROMUSCULAR REEDUCATION: CPT | Performed by: PHYSICAL THERAPIST

## 2021-06-04 NOTE — PROGRESS NOTES
Daily Note     Today's date: 2021  Patient name: Lorelei Dumont  : 1970  MRN: [id number]  Referring provider: ADOLFO Canas  Dx:   Encounter Diagnosis     ICD-10-CM    1  Hallux limitus of right foot  M20 5X1    2  Primary osteoarthritis of right foot  M19 071                   Subjective: Linda feels she is reaching a plateau due to some soreness with addition of new exercises at home  Objective: See treatment diary below      Assessment: Tolerated treatment well and continues to have discomfort over the scar, and distal to the scar  Educated on desensitzation interventions  Modified functional march to decrease ROM  Patient exhibited good technique with therapeutic exercises and would benefit from continued PT      Plan: Continue per plan of care  Progress treatment as tolerated         Precautions: na      Manuals        1 met mobs,  Distraction, ext 15' 10 10 10   10 10         IASTM plantar fascia, 1met  5' 10 10 10   10 10       Medial calf RLE  10' 10 10 10 10       Nerve mobilization      10       Neuro Re-Ed             Toe yoga 10x            Toe splay 10x            Arch dome +TB nv            Hallux ext 1/2 roll + squat nv            Functional HR    15x         SL hop    15x         Banded functional march+HR     30x        Ther Ex                                                                                                                     Ther Activity                                       Gait Training                                       Modalities

## 2021-06-16 ENCOUNTER — OFFICE VISIT (OUTPATIENT)
Dept: PHYSICAL THERAPY | Facility: CLINIC | Age: 51
End: 2021-06-16
Payer: COMMERCIAL

## 2021-06-16 DIAGNOSIS — M19.071 PRIMARY OSTEOARTHRITIS OF RIGHT FOOT: ICD-10-CM

## 2021-06-16 DIAGNOSIS — M20.5X1 HALLUX LIMITUS OF RIGHT FOOT: Primary | ICD-10-CM

## 2021-06-16 PROCEDURE — 97112 NEUROMUSCULAR REEDUCATION: CPT | Performed by: PHYSICAL THERAPIST

## 2021-06-16 NOTE — PROGRESS NOTES
Daily Note     Today's date: 2021  Patient name: Hernesto Manzanares  : 1970  MRN: [id number]  Referring provider: ADOLFO Green  Dx:   Encounter Diagnosis     ICD-10-CM    1  Hallux limitus of right foot  M20 5X1    2  Primary osteoarthritis of right foot  M19 071                   Subjective: Linda reports no limitations in her foot or toe throughout her trip last week  She even caught herself chasing after a pet without experiencing any pain  She had no pain with single leg hops, which she has been performing daily  Objective: See treatment diary below      Assessment: Tolerated treatment well and continues to have some joint pain, but soft tissue discomfort is largely resolved    Patient would benefit from continued PT      Plan: Trial walk/run progression next visit       Precautions: na      Manuals       1 met mobs,  Distraction, ext 15' 10 10 10   10 10   10      IASTM plantar fascia, 1met  5' 10 10 10   10 10 10      Medial calf RLE  10' 10 10 10 10 10      Nerve mobilization      10 10      Neuro Re-Ed             Toe yoga 10x            Toe splay 10x            Arch dome +TB nv            Hallux ext 1/2 roll + squat nv            Functional HR    15x         SL hop    15x         Banded functional march+HR     30x        Ther Ex                                                                                                                     Ther Activity                                       Gait Training                                       Modalities

## 2021-06-18 ENCOUNTER — OFFICE VISIT (OUTPATIENT)
Dept: PHYSICAL THERAPY | Facility: CLINIC | Age: 51
End: 2021-06-18
Payer: COMMERCIAL

## 2021-06-18 DIAGNOSIS — M20.5X1 HALLUX LIMITUS OF RIGHT FOOT: Primary | ICD-10-CM

## 2021-06-18 DIAGNOSIS — M19.071 PRIMARY OSTEOARTHRITIS OF RIGHT FOOT: ICD-10-CM

## 2021-06-18 PROCEDURE — 97112 NEUROMUSCULAR REEDUCATION: CPT | Performed by: PHYSICAL THERAPIST

## 2021-06-18 NOTE — PROGRESS NOTES
Daily Note     Today's date: 2021  Patient name: Carter Brizuela  : 1970  MRN: [id number]  Referring provider: ADOLFO Kaiser  Dx:   Encounter Diagnosis     ICD-10-CM    1  Hallux limitus of right foot  M20 5X1    2  Primary osteoarthritis of right foot  M19 071                   Subjective: Linda is excited to begin return to running transition  Objective: See treatment diary below      Assessment: Tolerated treatment well and tolerated 2/4 run walk x3 without significant symptoms  She will perform this once every 2-3 days for the next week along with prescribed running drills  Further progression pending patient tolerance  Patient exhibited good technique with therapeutic exercises and would benefit from continued PT      Plan: Continue per plan of care  Progress treatment as tolerated         Precautions: na      Manuals      1 met mobs,  Distraction, ext 15' 10 10 10   10 10   10      IASTM plantar fascia, 1met  5' 10 10 10   10 10 10      Medial calf RLE  10' 10 10 10 10 10      Nerve mobilization      10 10      Neuro Re-Ed             Toe yoga 10x            Toe splay 10x            Arch dome +TB nv            Hallux ext / roll + squat nv            Functional HR    15x         SL hop    15x         Banded functional march+HR     30x        Ther Ex                                                                                                                     Ther Activity                                       Gait Training             TM run        15'                  Modalities

## 2021-06-25 ENCOUNTER — APPOINTMENT (OUTPATIENT)
Dept: PHYSICAL THERAPY | Facility: CLINIC | Age: 51
End: 2021-06-25
Payer: COMMERCIAL

## 2021-06-26 ENCOUNTER — OFFICE VISIT (OUTPATIENT)
Dept: URGENT CARE | Facility: MEDICAL CENTER | Age: 51
End: 2021-06-26
Payer: COMMERCIAL

## 2021-06-26 VITALS
RESPIRATION RATE: 16 BRPM | DIASTOLIC BLOOD PRESSURE: 64 MMHG | SYSTOLIC BLOOD PRESSURE: 155 MMHG | TEMPERATURE: 97.4 F | OXYGEN SATURATION: 100 % | HEART RATE: 64 BPM

## 2021-06-26 DIAGNOSIS — L23.7 ALLERGIC CONTACT DERMATITIS DUE TO PLANTS, EXCEPT FOOD: Primary | ICD-10-CM

## 2021-06-26 PROCEDURE — 99213 OFFICE O/P EST LOW 20 MIN: CPT | Performed by: NURSE PRACTITIONER

## 2021-06-26 RX ORDER — METHYLPREDNISOLONE 4 MG/1
TABLET ORAL
Qty: 1 EACH | Refills: 0 | Status: SHIPPED | OUTPATIENT
Start: 2021-06-26

## 2021-06-26 NOTE — PROGRESS NOTES
[facility] [employer] [facility]        NAME: Vivian Stubbs is a 44 y o  female  : 1970    MRN: [id number]  DATE: 2021  TIME: 8:21 AM    Assessment and Plan   Allergic contact dermatitis due to plants, except food [L23 7]  1  Allergic contact dermatitis due to plants, except food  methylPREDNISolone 4 MG tablet therapy pack     Start course of medrol dose pack   Discussed washing clothes in hot soapy water, wash dogs  May use antihistamine like claritin or zyrtec if happens again in the future  Pt in agreement with plan     Patient Instructions     Follow up with PCP in 3-5 days  Proceed to  ER if symptoms worsen  Chief Complaint     Chief Complaint   Patient presents with    Rash     Patient presents with a rash that started last   She reports itching and spreading but no pain  History of Present Illness   Vivian Stubbs presents to the clinic c/o    Patient presents with a rash that started last   She reports itching and spreading but no pain  Does go hiking a lot   Also has dogs that are out in the woods a lot but tries to keep them out of the weeds  Self treatment includes - hydrocortisone cream and a ivywash         Review of Systems   Review of Systems   All other systems reviewed and are negative  Current Medications     No long-term medications on file  Current Allergies     Allergies as of 2021    (No Known Allergies)            The following portions of the patient's history were reviewed and updated as appropriate: allergies, current medications, past family history, past medical history, past social history, past surgical history and problem list     Objective   /64   Pulse 64   Temp (!) 97 4 °F (36 3 °C) (Tympanic)   Resp 16   SpO2 100%        Physical Exam     Physical Exam  Vitals and nursing note reviewed  Constitutional:       Appearance: She is well-developed  HENT:      Head: Normocephalic and atraumatic     Eyes: General: Lids are normal       Conjunctiva/sclera: Conjunctivae normal    Cardiovascular:      Rate and Rhythm: Normal rate and regular rhythm  Heart sounds: Normal heart sounds, S1 normal and S2 normal    Pulmonary:      Effort: Pulmonary effort is normal       Breath sounds: Normal breath sounds  Skin:     General: Skin is warm and dry  Findings: Rash present  Neurological:      Mental Status: She is alert and oriented to person, place, and time  Psychiatric:         Speech: Speech normal          Behavior: Behavior normal  Behavior is cooperative  Thought Content:  Thought content normal          Judgment: Judgment normal

## 2021-06-26 NOTE — PATIENT INSTRUCTIONS
Contact Dermatitis   AMBULATORY CARE:   Contact dermatitis  is a rash  It develops when you touch something that irritates your skin or causes an allergic reaction  Common signs and symptoms include the following:   · Red, swollen, painful rash    · Skin that itches, stings, or burns    · Dry, scaly, or crusty skin patches    · Bumps or blisters    · Fluid draining from blisters    Call 911 for any of the following:   · You have sudden trouble breathing  · Your throat swells and you have trouble eating  · Your face is swollen  Contact your healthcare provider if:   · You have a fever  · Your blisters are draining pus  · Your rash spreads or does not get better, even after treatment  · You have questions or concerns about your condition or care  Treatment for contact dermatitis  involves removing any irritants or allergens that cause your rash  You may also need medicines to decrease itching and swelling  They will be given as a topical medicine to apply to your rash or as a pill  Manage contact dermatitis:   · Take short baths or showers in cool water  Use mild soap or soap-free cleansers  Add oatmeal, baking soda, or cornstarch to the bath water to help decrease skin irritation  · Avoid skin irritants , such as makeup, hair products, soaps, and cleansers  Use products that do not contain perfume or dye  · Apply a cool compress to your rash  This will help soothe your skin  · Keep your skin moist   Rub unscented cream or lotion on your skin to prevent dryness and itching  Do this right after a bath or shower when your skin is still damp  Follow up with your healthcare provider as directed:  Write down your questions so you remember to ask them during your visits  © Copyright [address] Information is for End User's use only and may not be sold, redistributed or otherwise used for commercial purposes   All illustrations and images included in CareNotes® are the copyrighted property of A D [employer] M [employer]  or [facility] Margy Fong   The above information is an  only  It is not intended as medical advice for individual conditions or treatments  Talk to your doctor, nurse or pharmacist before following any medical regimen to see if it is safe and effective for you

## 2021-06-30 ENCOUNTER — OFFICE VISIT (OUTPATIENT)
Dept: PHYSICAL THERAPY | Facility: CLINIC | Age: 51
End: 2021-06-30
Payer: COMMERCIAL

## 2021-06-30 DIAGNOSIS — M20.5X1 HALLUX LIMITUS OF RIGHT FOOT: Primary | ICD-10-CM

## 2021-06-30 DIAGNOSIS — M19.071 PRIMARY OSTEOARTHRITIS OF RIGHT FOOT: ICD-10-CM

## 2021-06-30 PROCEDURE — 97112 NEUROMUSCULAR REEDUCATION: CPT | Performed by: PHYSICAL THERAPIST

## 2021-06-30 NOTE — PROGRESS NOTES
Daily Note     Today's date: 2021  Patient name: Vivian Stubbs  : 1970  MRN: [id number]  Referring provider: ADOLFO Hdez  Dx:   Encounter Diagnosis     ICD-10-CM    1  Hallux limitus of right foot  M20 5X1    2  Primary osteoarthritis of right foot  M19 071                 Subjective: Trinity Hart is progressing well with home program   She is now up to third phase of walk/run progression  Objective: See treatment diary below      Assessment: Tolerated treatment well and progressed single limb stability and strengthening today with good tolerance  Education on technology to monitor GCT during runs as well as strategies to monitor shoe mileage  Patient exhibited good technique with therapeutic exercises and would benefit from continued PT      Plan: Continue per plan of care  Progress treatment as tolerated         Precautions: na      Manuals     1 met mobs,  Distraction, ext 15' 10 10 10   10 10   10  JL    IASTM plantar fascia, 1met  5' 10 10 10   10 10 10      Medial calf RLE  10' 10 10 10 10 10      Nerve mobilization      10 10  JL    Neuro Re-Ed             Toe yoga 10x            Toe splay 10x            SL RDL Melina             SL OH press         10# x12    Functional HR    15x     10# x10    [place] walk         painful    SL hop    15x         Banded functional march+HR     30x        Education -          Run progression 10'    Ther Ex                                                                                                                     Ther Activity                                       Gait Training             TM run        15'                  Modalities

## 2021-07-07 ENCOUNTER — OFFICE VISIT (OUTPATIENT)
Dept: PHYSICAL THERAPY | Facility: CLINIC | Age: 51
End: 2021-07-07
Payer: COMMERCIAL

## 2021-07-07 DIAGNOSIS — M19.071 PRIMARY OSTEOARTHRITIS OF RIGHT FOOT: ICD-10-CM

## 2021-07-07 DIAGNOSIS — M20.5X1 HALLUX LIMITUS OF RIGHT FOOT: Primary | ICD-10-CM

## 2021-07-07 PROCEDURE — 97112 NEUROMUSCULAR REEDUCATION: CPT | Performed by: PHYSICAL THERAPIST

## 2021-07-07 NOTE — PROGRESS NOTES
Daily Note     Today's date: 2021  Patient name: Fredy Mello  : 1970  MRN: [id number]  Referring provider: ADOLFO Phipps  Dx:   Encounter Diagnosis     ICD-10-CM    1  Hallux limitus of right foot  M20 5X1    2  Primary osteoarthritis of right foot  M19 071                   Subjective: Linda reports continued improvement in running tolerance  No up to 6 sets of 5/1 run/walk  Objective: See treatment diary below      Assessment: Tolerated treatment well and reviewed single limb strengthening and drills with good tolerance today  Some incrased tension noted t/o plantar fascia  She will be scheduling a shoe fitting next week    Patient demonstrated fatigue post treatment and would benefit from continued PT      Plan: Continue per plan of care  Progress treatment as tolerated         Precautions: na      Manuals    1 met mobs,  Distraction, ext 15' 10 10 10   10 10   10  JL JL   IASTM plantar fascia, 1met  5' 10 10 10   10 10 10      Medial calf RLE  10' 10 10 10 10 10      Nerve mobilization      10 10  JL    Neuro Re-Ed             Toe yoga 10x            Toe splay 10x            SL RDL Melina             SL OH press         10# x12 10# 2x10   Functional HR    15x     10# x10 10# 2x10   [place] walk         painful    SL hop    15x         Banded functional march+HR     30x        Education -          Run progression 10' JL   A skip, toe agility, high knee, butt kicks          3x ea   Ther Ex                                                                                                                     Ther Activity                                       Gait Training             TM run        15'                  Modalities

## 2021-07-13 ENCOUNTER — TELEPHONE (OUTPATIENT)
Dept: FAMILY MEDICINE CLINIC | Facility: CLINIC | Age: 51
End: 2021-07-13

## 2021-07-13 NOTE — TELEPHONE ENCOUNTER
lmom for pt to call back to update mammogram records since nothing is on file also to make sure we are still her pcp since she was only seen once for a preop clearance and has no future appts

## 2021-07-14 ENCOUNTER — APPOINTMENT (OUTPATIENT)
Dept: PHYSICAL THERAPY | Facility: CLINIC | Age: 51
End: 2021-07-14
Payer: COMMERCIAL

## 2021-07-21 ENCOUNTER — OFFICE VISIT (OUTPATIENT)
Dept: PHYSICAL THERAPY | Facility: CLINIC | Age: 51
End: 2021-07-21
Payer: COMMERCIAL

## 2021-07-21 DIAGNOSIS — M20.5X1 HALLUX LIMITUS OF RIGHT FOOT: Primary | ICD-10-CM

## 2021-07-21 DIAGNOSIS — M19.071 PRIMARY OSTEOARTHRITIS OF RIGHT FOOT: ICD-10-CM

## 2021-07-21 PROCEDURE — 97112 NEUROMUSCULAR REEDUCATION: CPT | Performed by: PHYSICAL THERAPIST

## 2021-07-21 NOTE — PROGRESS NOTES
Daily Note     Today's date: 2021  Patient name: Andrés Jauregui  : 1970  MRN: [id number]  Referring provider: ADOLFO Edge  Dx:   Encounter Diagnosis     ICD-10-CM    1  Hallux limitus of right foot  M20 5X1    2  Primary osteoarthritis of right foot  M19 071                   Subjective: Linda notes return to running 3-5 miles 3x/week  She has mild intermittent symptoms in her toe, overall satisfied with her progress      Objective: See treatment diary below      Assessment: Tolerated treatment well and reviewed drills, rojelio exercises and volume progression  She will continue to work independently to return to previous level of running   Patient to call back if symptoms return      Plan: DC to home management     Precautions: na      Manuals    1 met mobs,  Distraction, ext 15' 10 10 10   10 10   10  JL JL   IASTM plantar fascia, 1met  5' 10 10 10   10 10 10      Medial calf RLE  10' 10 10 10 10 10      Nerve mobilization      10 10  JL    Neuro Re-Ed             Toe yoga             Toe splay             SL RDL [place]             SL OH press         10# x12 10# 2x10   Functional HR    15x     10# x10 10# 2x10   [place] walk         painful    SL hop    15x         Banded functional march+HR     30x        Education -          Run progression 10' JL   A skip, toe agility, high knee, butt kicks          3x ea   Ther Ex                                                                                                                     Ther Activity                                       Gait Training             TM run        15'                  Modalities

## 2021-07-28 ENCOUNTER — APPOINTMENT (OUTPATIENT)
Dept: PHYSICAL THERAPY | Facility: CLINIC | Age: 51
End: 2021-07-28
Payer: COMMERCIAL

## 2022-06-26 ENCOUNTER — OFFICE VISIT (OUTPATIENT)
Dept: URGENT CARE | Facility: MEDICAL CENTER | Age: 52
End: 2022-06-26
Payer: COMMERCIAL

## 2022-06-26 VITALS
SYSTOLIC BLOOD PRESSURE: 123 MMHG | RESPIRATION RATE: 20 BRPM | TEMPERATURE: 98.2 F | WEIGHT: 134 LBS | HEART RATE: 61 BPM | BODY MASS INDEX: 20.31 KG/M2 | DIASTOLIC BLOOD PRESSURE: 77 MMHG | OXYGEN SATURATION: 100 % | HEIGHT: 68 IN

## 2022-06-26 DIAGNOSIS — A69.20 LYME DISEASE WITH ERYTHEMA MIGRANS LESION 5 CM OR GREATER IN DIAMETER: Primary | ICD-10-CM

## 2022-06-26 PROCEDURE — 99213 OFFICE O/P EST LOW 20 MIN: CPT | Performed by: PHYSICIAN ASSISTANT

## 2022-06-26 RX ORDER — AMOXICILLIN 875 MG/1
875 TABLET, COATED ORAL 2 TIMES DAILY
Qty: 28 TABLET | Refills: 0 | Status: SHIPPED | OUTPATIENT
Start: 2022-06-26 | End: 2022-07-10
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
## 2022-06-27 ENCOUNTER — OFFICE VISIT (OUTPATIENT)
Dept: FAMILY MEDICINE CLINIC | Facility: CLINIC | Age: 52
End: 2022-06-27
Payer: COMMERCIAL

## 2022-06-27 ENCOUNTER — APPOINTMENT (OUTPATIENT)
Dept: LAB | Facility: MEDICAL CENTER | Age: 52
End: 2022-06-27
Payer: COMMERCIAL

## 2022-06-27 VITALS
OXYGEN SATURATION: 99 % | WEIGHT: 138 LBS | TEMPERATURE: 96.2 F | BODY MASS INDEX: 20.92 KG/M2 | HEART RATE: 86 BPM | HEIGHT: 68 IN
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
## 2022-06-27 DIAGNOSIS — S40.861D TICK BITE OF RIGHT UPPER ARM, SUBSEQUENT ENCOUNTER: Primary | ICD-10-CM

## 2022-06-27 DIAGNOSIS — S40.861D TICK BITE OF RIGHT UPPER ARM, SUBSEQUENT ENCOUNTER: ICD-10-CM

## 2022-06-27 DIAGNOSIS — A69.20 ERYTHEMA MIGRANS (LYME DISEASE): ICD-10-CM
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
## 2022-06-27 PROBLEM — S40.861A TICK BITE OF RIGHT UPPER ARM: Status: ACTIVE | Noted: 2022-06-27

## 2022-06-27 PROCEDURE — 36415 COLL VENOUS BLD VENIPUNCTURE: CPT

## 2022-06-27 PROCEDURE — 86618 LYME DISEASE ANTIBODY: CPT

## 2022-06-27 PROCEDURE — 1036F TOBACCO NON-USER: CPT | Performed by: FAMILY MEDICINE

## 2022-06-27 PROCEDURE — 3725F SCREEN DEPRESSION PERFORMED: CPT | Performed by: FAMILY MEDICINE

## 2022-06-27 PROCEDURE — 3008F BODY MASS INDEX DOCD: CPT | Performed by: FAMILY MEDICINE

## 2022-06-27 PROCEDURE — 99213 OFFICE O/P EST LOW 20 MIN: CPT | Performed by: FAMILY MEDICINE

## 2022-06-27 RX ORDER — DOXYCYCLINE HYCLATE 100 MG
100 TABLET ORAL 2 TIMES DAILY
Qty: 28 TABLET | Refills: 0 | Status: SHIPPED | OUTPATIENT
Start: 2022-06-27 | End: 2022-07-11

## 2022-06-27 NOTE — PROGRESS NOTES
Assessment/Plan:    Tick bite of right upper arm  - Discontinue Amoxicillin and start 14 day course of Doxycyline which will also be effective against some co-infecting agents  Diagnoses and all orders for this visit:    Tick bite of right upper arm, subsequent encounter  -     doxycycline hyclate (VIBRA-TABS) 100 mg tablet; Take 1 tablet (100 mg total) by mouth 2 (two) times a day for 14 days  -     Lyme Ab/Western Blot Reflex; Future    Erythema migrans (Lyme disease)          Subjective:      Patient ID: Hira Carlin is a 46 y o  female  HPI  Hira Carlin is a 46year old female who presents today for evaluation of a tick bite  Patient is an avid hiker and went hiking recently  She did not see any tick bites but has noticed an enlarging bull's eye rash on her right arm   Patient was seen in the urgent care 6/26 and started on a course of Amoxicillin  The following portions of the patient's history were reviewed and updated as appropriate: allergies, current medications, past family history, past medical history, past social history, past surgical history and problem list     Review of Systems   Constitutional: Negative  HENT: Negative  Eyes: Negative  Respiratory: Negative  Cardiovascular: Negative  Gastrointestinal: Negative  Genitourinary: Negative  Musculoskeletal: Negative  Skin: Positive for rash  Neurological: Negative  Psychiatric/Behavioral: Negative  Objective:      Pulse 86   Temp (!) 96 2 °F (35 7 °C) (Skin)   Ht 5' 8" (1 727 m)   Wt 62 6 kg (138 lb)   LMP 08/13/2020 (Exact Date)   SpO2 99%   BMI 20 98 kg/m²          Physical Exam  Constitutional:       General: She is not in acute distress  Appearance: She is not ill-appearing  HENT:      Head: Normocephalic and atraumatic  Eyes:      General:         Right eye: No discharge  Left eye: No discharge  Extraocular Movements: Extraocular movements intact  Pulmonary:      Effort: No respiratory distress  Skin:     Findings: Rash present  Comments: (see below)   Neurological:      General: No focal deficit present  Mental Status: She is alert     Psychiatric:         Mood and Affect: Mood normal          Behavior: Behavior normal

## 2022-06-27 NOTE — ASSESSMENT & PLAN NOTE
- Discontinue Amoxicillin and start 14 day course of Doxycyline which will also be effective against some co-infecting agents

## 2022-06-28 LAB — B BURGDOR IGG+IGM SER-ACNC: >8 AI

## 2022-07-12 ENCOUNTER — TELEPHONE (OUTPATIENT)
Dept: FAMILY MEDICINE CLINIC | Facility: CLINIC | Age: 52
End: 2022-07-12

## 2022-07-12 NOTE — TELEPHONE ENCOUNTER
Please Advise  Patient called stating she went and got blood work done and came back positive for lymes and was on doxycycline and finished it, lab called and said they needed more blood for a western blot panel, patient wants to know if it is even worth getting done since she finished her 14 days of doxycycline   Thank you

## 2023-07-02 ENCOUNTER — OFFICE VISIT (OUTPATIENT)
Dept: URGENT CARE | Facility: MEDICAL CENTER | Age: 53
End: 2023-07-02
Payer: COMMERCIAL

## 2023-07-02 VITALS
DIASTOLIC BLOOD PRESSURE: 79 MMHG | WEIGHT: 133 LBS | OXYGEN SATURATION: 100 % | HEIGHT: 68 IN | SYSTOLIC BLOOD PRESSURE: 126 MMHG | TEMPERATURE: 97.7 F | RESPIRATION RATE: 20 BRPM | HEART RATE: 53 BPM | BODY MASS INDEX: 20.16 KG/M2

## 2023-07-02 DIAGNOSIS — S00.06XA TICK BITE OF SCALP, INITIAL ENCOUNTER: ICD-10-CM

## 2023-07-02 DIAGNOSIS — R59.1 LYMPHADENOPATHY: Primary | ICD-10-CM
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
## 2023-07-02 PROCEDURE — 99213 OFFICE O/P EST LOW 20 MIN: CPT | Performed by: PHYSICIAN ASSISTANT

## 2023-07-02 RX ORDER — DOXYCYCLINE 100 MG/1
100 TABLET ORAL 2 TIMES DAILY
Qty: 42 TABLET | Refills: 0 | Status: SHIPPED | OUTPATIENT
Start: 2023-07-02 | End: 2023-07-23
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
## 2023-07-03 ENCOUNTER — OFFICE VISIT (OUTPATIENT)
Dept: FAMILY MEDICINE CLINIC | Facility: CLINIC | Age: 53
End: 2023-07-03
Payer: COMMERCIAL

## 2023-07-03 VITALS
WEIGHT: 133 LBS | HEIGHT: 68 IN | BODY MASS INDEX: 20.16 KG/M2 | OXYGEN SATURATION: 99 % | TEMPERATURE: 97.9 F | HEART RATE: 93 BPM | SYSTOLIC BLOOD PRESSURE: 116 MMHG | DIASTOLIC BLOOD PRESSURE: 80 MMHG
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
## 2023-07-03 DIAGNOSIS — D22.4 NEVUS OF SCALP: Primary | ICD-10-CM

## 2023-07-03 PROCEDURE — 99213 OFFICE O/P EST LOW 20 MIN: CPT | Performed by: FAMILY MEDICINE

## 2023-07-05 NOTE — PROGRESS NOTES
Assessment/Plan: We did discussion regarding treatment for Lyme disease. She is on appropriate treatment with doxycycline although I do not believe she needs 3 weeks of doxycycline. She should be able to do well on 2 weeks and then discontinue. She will call with any symptoms of Lyme disease. Consider follow-up with dermatologist for removal of lesion to scalp. 1. Nevus of scalp    2. Tick bite, unspecified site, initial encounter          Subjective:      Patient ID: Susanne Moon is a 45 y.o. female. Patient with recent tick bite. She has been started on doxycycline. She is tolerating this well. She does not have any overt symptoms of Lyme disease. She also has a nevus to the scalp that has been present for several years. Not itchy. The following portions of the patient's history were reviewed and updated as appropriate: allergies, current medications, past family history, past medical history, past social history, past surgical history, and problem list.    Review of Systems   Constitutional: Negative. HENT: Negative. Eyes: Negative. Respiratory: Negative. Cardiovascular: Negative. Gastrointestinal: Negative. Endocrine: Negative. Genitourinary: Negative. Musculoskeletal: Negative. Skin: Negative. As noted in HPI   Allergic/Immunologic: Negative. Neurological: Negative. Hematological: Negative. Psychiatric/Behavioral: Negative. Objective:      /80 (BP Location: Left arm, Patient Position: Sitting, Cuff Size: Standard)   Pulse 93   Temp 97.9 °F (36.6 °C) (Tympanic)   Ht 5' 8" (1.727 m)   Wt 60.3 kg (133 lb)   LMP 08/13/2020 (Exact Date)   SpO2 99%   BMI 20.22 kg/m²          Physical Exam  Vitals reviewed. Constitutional:       Appearance: She is well-developed. HENT:      Head: Normocephalic and atraumatic. Right Ear: External ear normal. Tympanic membrane is not erythematous or bulging.       Left Ear: External ear normal. Tympanic membrane is not erythematous or bulging. Nose: Nose normal.      Mouth/Throat:      Mouth: No oral lesions. Pharynx: No oropharyngeal exudate. Eyes:      General: No scleral icterus. Right eye: No discharge. Left eye: No discharge. Conjunctiva/sclera: Conjunctivae normal.   Neck:      Thyroid: No thyromegaly. Cardiovascular:      Rate and Rhythm: Normal rate and regular rhythm. Heart sounds: Normal heart sounds. No murmur heard. No friction rub. No gallop. Pulmonary:      Effort: Pulmonary effort is normal. No respiratory distress. Breath sounds: No wheezing or rales. Chest:      Chest wall: No tenderness. Abdominal:      General: Bowel sounds are normal. There is no distension. Palpations: Abdomen is soft. There is no mass. Tenderness: There is no abdominal tenderness. There is no guarding or rebound. Musculoskeletal:         General: No tenderness or deformity. Normal range of motion. Cervical back: Normal range of motion and neck supple. Lymphadenopathy:      Cervical: No cervical adenopathy. Skin:     General: Skin is warm and dry. Coloration: Skin is not pale. Findings: No erythema or rash. Comments: Flesh-colored nevus present to the scalp. Neurological:      Mental Status: She is alert and oriented to person, place, and time. Cranial Nerves: No cranial nerve deficit. Motor: No abnormal muscle tone. Coordination: Coordination normal.      Deep Tendon Reflexes: Reflexes are normal and symmetric.    Psychiatric:         Behavior: Behavior normal.

## 2023-10-18 ENCOUNTER — VBI (OUTPATIENT)
Dept: ADMINISTRATIVE | Facility: OTHER | Age: 53
End: 2023-10-18

## 2023-11-30 ENCOUNTER — VBI (OUTPATIENT)
Dept: ADMINISTRATIVE | Facility: OTHER | Age: 53
End: 2023-11-30

## 2024-09-05 ENCOUNTER — VBI (OUTPATIENT)
Dept: ADMINISTRATIVE | Facility: OTHER | Age: 54
End: 2024-09-05

## 2024-09-05 NOTE — TELEPHONE ENCOUNTER
09/05/24 1:47 PM     Chart reviewed for CRC: Colonoscopy, Mammogram, and Pap Smear (HPV) aka Cervical Cancer Screening was/were not submitted to the patient's insurance.     Joselin Moreno MA   [facility]

## 2024-09-09 ENCOUNTER — OFFICE VISIT (OUTPATIENT)
Dept: OBGYN CLINIC | Facility: MEDICAL CENTER | Age: 54
End: 2024-09-09
Payer: COMMERCIAL

## 2024-09-09 VITALS
WEIGHT: 131 LBS | DIASTOLIC BLOOD PRESSURE: 72 MMHG | HEART RATE: 51 BPM | BODY MASS INDEX: 19.85 KG/M2 | HEIGHT: 68 IN | SYSTOLIC BLOOD PRESSURE: 123 MMHG

## 2024-09-09 DIAGNOSIS — M24.811 INTERNAL DERANGEMENT OF RIGHT SHOULDER: Primary | ICD-10-CM

## 2024-09-09 PROCEDURE — 99203 OFFICE O/P NEW LOW 30 MIN: CPT | Performed by: ORTHOPAEDIC SURGERY
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
## 2024-09-24 ENCOUNTER — HOSPITAL ENCOUNTER (OUTPATIENT)
Facility: MEDICAL CENTER | Age: 54
Discharge: HOME | End: 2024-09-24
Payer: COMMERCIAL

## 2024-09-24 DIAGNOSIS — M24.811 INTERNAL DERANGEMENT OF RIGHT SHOULDER: ICD-10-CM

## 2024-09-24 PROCEDURE — 73221 MRI JOINT UPR EXTREM W/O DYE: CPT

## 2024-10-01 ENCOUNTER — OFFICE VISIT (OUTPATIENT)
Dept: OBGYN CLINIC | Facility: MEDICAL CENTER | Age: 54
End: 2024-10-01
Payer: COMMERCIAL

## 2024-10-01 VITALS — SYSTOLIC BLOOD PRESSURE: 132 MMHG | HEART RATE: 55 BPM | DIASTOLIC BLOOD PRESSURE: 68 MMHG

## 2024-10-01 DIAGNOSIS — S43.431A SUPERIOR LABRUM ANTERIOR-TO-POSTERIOR (SLAP) TEAR OF RIGHT SHOULDER: ICD-10-CM

## 2024-10-01 DIAGNOSIS — S42.254A CLOSED NONDISPLACED FRACTURE OF GREATER TUBEROSITY OF RIGHT HUMERUS, INITIAL ENCOUNTER: Primary | ICD-10-CM

## 2024-10-01 DIAGNOSIS — M25.611 SHOULDER STIFFNESS, RIGHT: ICD-10-CM

## 2024-10-01 PROCEDURE — 99214 OFFICE O/P EST MOD 30 MIN: CPT | Performed by: ORTHOPAEDIC SURGERY

## 2024-10-01 NOTE — LETTER
October 1, 2024     Patient: Kathy Aden  YOB: 1970  Date of Visit: 10/1/2024      To Whom it May Concern:    Kathy Aden is under my professional care. Kathy was seen in my office on 10/1/2024. Kathy will be out of work over the next 3 weeks. 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    If you have any questions or concerns, please don't hesitate to call.         Sincerely,          Cain Welsh,         CC: No Recipients
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
## 2024-10-30 ENCOUNTER — OFFICE VISIT (OUTPATIENT)
Dept: OBGYN CLINIC | Facility: MEDICAL CENTER | Age: 54
End: 2024-10-30
Payer: COMMERCIAL

## 2024-10-30 ENCOUNTER — APPOINTMENT (OUTPATIENT)
Dept: RADIOLOGY | Facility: MEDICAL CENTER | Age: 54
End: 2024-10-30
Payer: COMMERCIAL

## 2024-10-30 VITALS
SYSTOLIC BLOOD PRESSURE: 130 MMHG | HEART RATE: 70 BPM | WEIGHT: 131 LBS | HEIGHT: 68 IN | DIASTOLIC BLOOD PRESSURE: 78 MMHG | BODY MASS INDEX: 19.85 KG/M2

## 2024-10-30 DIAGNOSIS — S42.254A CLOSED NONDISPLACED FRACTURE OF GREATER TUBEROSITY OF RIGHT HUMERUS, INITIAL ENCOUNTER: Primary | ICD-10-CM

## 2024-10-30 DIAGNOSIS — S42.254A CLOSED NONDISPLACED FRACTURE OF GREATER TUBEROSITY OF RIGHT HUMERUS, INITIAL ENCOUNTER: ICD-10-CM

## 2024-10-30 PROCEDURE — 73030 X-RAY EXAM OF SHOULDER: CPT

## 2024-10-30 PROCEDURE — 99213 OFFICE O/P EST LOW 20 MIN: CPT | Performed by: ORTHOPAEDIC SURGERY
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
## 2024-10-30 NOTE — LETTER
October 30, 2024     Patient: Kathy Aden  YOB: 1970  Date of Visit: 10/30/2024      To Whom it May Concern:     Kathy Aden is under my professional care. Kathy was seen in my office on 10/30/2024. Kathy will be out of work until the next office visit.  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     If you have any questions or concerns, please don't hesitate to call.           Sincerely,            Cain Welsh DO    If you have any questions or concerns, please don't hesitate to call.         Sincerely,          Cain Welsh DO        CC: No Recipients

## 2024-11-11 ENCOUNTER — HOSPITAL ENCOUNTER (OUTPATIENT)
Dept: RADIOLOGY | Facility: HOSPITAL | Age: 54
Discharge: HOME | End: 2024-11-11
Attending: ORTHOPAEDIC SURGERY
Payer: COMMERCIAL

## 2024-11-11 DIAGNOSIS — S42.254A CLOSED NONDISPLACED FRACTURE OF GREATER TUBEROSITY OF RIGHT HUMERUS, INITIAL ENCOUNTER: ICD-10-CM

## 2024-11-11 PROCEDURE — 20610 DRAIN/INJ JOINT/BURSA W/O US: CPT

## 2024-11-11 PROCEDURE — 77002 NEEDLE LOCALIZATION BY XRAY: CPT

## 2024-11-11 RX ORDER — LIDOCAINE HYDROCHLORIDE 10 MG/ML
5 INJECTION, SOLUTION EPIDURAL; INFILTRATION; INTRACAUDAL; PERINEURAL
Status: COMPLETED | OUTPATIENT
Start: 2024-11-11 | End: 2024-11-11

## 2024-11-11 RX ORDER — ROPIVACAINE HYDROCHLORIDE 2 MG/ML
2 INJECTION, SOLUTION EPIDURAL; INFILTRATION; PERINEURAL ONCE
Status: COMPLETED | OUTPATIENT
Start: 2024-11-11 | End: 2024-11-11

## 2024-11-11 RX ORDER — METHYLPREDNISOLONE ACETATE 80 MG/ML
80 INJECTION, SUSPENSION INTRA-ARTICULAR; INTRALESIONAL; INTRAMUSCULAR; SOFT TISSUE
Status: COMPLETED | OUTPATIENT
Start: 2024-11-11 | End: 2024-11-11

## 2024-11-11 RX ADMIN — METHYLPREDNISOLONE ACETATE 80 MG: 80 INJECTION, SUSPENSION INTRA-ARTICULAR; INTRALESIONAL; INTRAMUSCULAR; SOFT TISSUE at 15:45

## 2024-11-11 RX ADMIN — LIDOCAINE HYDROCHLORIDE 2 ML: 10 INJECTION, SOLUTION EPIDURAL; INFILTRATION; INTRACAUDAL; PERINEURAL at 15:45

## 2024-11-11 RX ADMIN — IOHEXOL 1 ML: 300 INJECTION, SOLUTION INTRAVENOUS at 15:45

## 2024-11-11 RX ADMIN — ROPIVACAINE HYDROCHLORIDE 2 ML: 2 INJECTION, SOLUTION EPIDURAL; INFILTRATION at 15:45

## 2024-11-14 ENCOUNTER — TELEPHONE (OUTPATIENT)
Age: 54
End: 2024-11-14

## 2024-11-14 NOTE — TELEPHONE ENCOUNTER
There are no forms for us to complete in the patients chart, did the patient say if she was going to get them faxed over or send them through [employer] just so I can keep an eye out, thank you.

## 2024-11-14 NOTE — TELEPHONE ENCOUNTER
Caller: Patient     Doctor: Anitha     Reason for call: Asked if paperwork was completed, I did tell patient about the 10-14 turn around time. Patient states the job wanted it by 11/21     Call back#: [phone]

## 2024-11-20 NOTE — TELEPHONE ENCOUNTER
Caller: Patient     Doctor: Anitha    Reason for call: Patient stated she dropped her ppwk off at the office 2 weeks ago and would like to know the status. Ppwk is due tomorrow. Please advise.     Call back#: [phone]

## 2024-12-11 ENCOUNTER — OFFICE VISIT (OUTPATIENT)
Dept: OBGYN CLINIC | Facility: MEDICAL CENTER | Age: 54
End: 2024-12-11
Payer: COMMERCIAL

## 2024-12-11 VITALS — HEART RATE: 84 BPM | SYSTOLIC BLOOD PRESSURE: 147 MMHG | DIASTOLIC BLOOD PRESSURE: 75 MMHG

## 2024-12-11 DIAGNOSIS — S42.254A CLOSED NONDISPLACED FRACTURE OF GREATER TUBEROSITY OF RIGHT HUMERUS, INITIAL ENCOUNTER: Primary | ICD-10-CM

## 2024-12-11 PROCEDURE — 99213 OFFICE O/P EST LOW 20 MIN: CPT | Performed by: ORTHOPAEDIC SURGERY

## 2024-12-11 RX ORDER — NAPROXEN 500 MG/1
500 TABLET ORAL 2 TIMES DAILY WITH MEALS
Qty: 60 TABLET | Refills: 0 | Status: SHIPPED | OUTPATIENT
Start: 2024-12-11
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
## 2024-12-11 NOTE — LETTER
December 11, 2024     Patient: Kathy Aden  YOB: 1970  Date of Visit: 12/11/2024      To Whom it May Concern:    Kathy Aden is under my professional care. Kathy was seen in my office on 12/11/2024. Kathy should remain out of work at this time. She needs time for continued healing to work on ROM, strength, and stability. She will be reevaluated in the office in 6 weeks.     If you have any questions or concerns, please don't hesitate to call.         Sincerely,          Cain Welsh DO        CC: No Recipients

## 2024-12-12 DIAGNOSIS — S42.254A CLOSED NONDISPLACED FRACTURE OF GREATER TUBEROSITY OF RIGHT HUMERUS, INITIAL ENCOUNTER: ICD-10-CM

## 2025-01-08 ENCOUNTER — TELEPHONE (OUTPATIENT)
Dept: ADMINISTRATIVE | Facility: OTHER | Age: 55
End: 2025-01-08

## 2025-01-08 NOTE — TELEPHONE ENCOUNTER
01/08/25 11:52 AM    The patient was called and a message was left with the return number for Central Scheduling [phone].    Thank you.  Radha Herrera  [facility]

## 2025-01-22 ENCOUNTER — OFFICE VISIT (OUTPATIENT)
Dept: OBGYN CLINIC | Facility: MEDICAL CENTER | Age: 55
End: 2025-01-22
Payer: COMMERCIAL

## 2025-01-22 ENCOUNTER — APPOINTMENT (OUTPATIENT)
Dept: RADIOLOGY | Facility: MEDICAL CENTER | Age: 55
End: 2025-01-22
Payer: COMMERCIAL

## 2025-01-22 VITALS — BODY MASS INDEX: 19.92 KG/M2 | HEIGHT: 68 IN

## 2025-01-22 DIAGNOSIS — M25.511 RIGHT SHOULDER PAIN, UNSPECIFIED CHRONICITY: ICD-10-CM

## 2025-01-22 DIAGNOSIS — M25.511 RIGHT SHOULDER PAIN, UNSPECIFIED CHRONICITY: Primary | ICD-10-CM

## 2025-01-22 DIAGNOSIS — M24.811 INTERNAL DERANGEMENT OF RIGHT SHOULDER: ICD-10-CM

## 2025-01-22 PROCEDURE — 73030 X-RAY EXAM OF SHOULDER: CPT

## 2025-01-22 PROCEDURE — 99213 OFFICE O/P EST LOW 20 MIN: CPT | Performed by: ORTHOPAEDIC SURGERY
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
## 2025-01-28 NOTE — NURSING NOTE
"Unable to reach patient, sent instructions and directions in e-mail that is linked to this account and via epic my chart. See message below.  Upcoming Radiology appointment at [facility]  Good afternoon Mrs. Aden,     You have an upcoming appointment here at [facility]. We are located at [address] [place] [address]. Your appointment is scheduled for 2/3/25 @ 3:30 pm  you are scheduled to have a right shoulder fluoroscopy guided steroid injection . You will need to enter [facility], come up to the 1st floor and locate the Radiology department. Registration is in the Radiology department, please arrive 15 minutes prior so you may go through the registration process.     For this test you may eat, drink, take all your usual medications. In regard to driving - if you are not taking any medication for anti-anxiety for the procedure you may drive yourself. BUT  if you are taking medications for anxiety (Xanax, Ativan etc.)  for the procedure you will need a .    If you have any questions or concerns regarding the above information,  please feel free to send me a response via a call, email or [employer].      If we have not spoken yet and understand the above information and have no further questions or concerns can you please send me a response via a call, email or Birdi chart that you have received and understood the information.     May you have a good day,   Zina Parrish RN  [facility] Radiology RN  [address] [place] [address]  [phone] (Office)  [phone] (Fax)  [email or web address]    Patient responded via my chart, see below  Hi, yes, I have received and understand the information.  I guess just one quick question, how can I tell which is [facility] (I know I was there once before for this procedure but I'm not sure if I entered through [facility] or not; are the buildings labeled \"A\", and " "\"B\"?)  thanks!  Linda     I sent back instructions regarding [facility]    "

## 2025-02-03 ENCOUNTER — HOSPITAL ENCOUNTER (OUTPATIENT)
Dept: RADIOLOGY | Facility: HOSPITAL | Age: 55
Discharge: HOME | End: 2025-02-03
Attending: ORTHOPAEDIC SURGERY
Payer: COMMERCIAL

## 2025-02-03 DIAGNOSIS — M25.511 RIGHT SHOULDER PAIN, UNSPECIFIED CHRONICITY: ICD-10-CM

## 2025-02-03 DIAGNOSIS — M24.811 INTERNAL DERANGEMENT OF RIGHT SHOULDER: ICD-10-CM

## 2025-02-03 PROCEDURE — 77002 NEEDLE LOCALIZATION BY XRAY: CPT

## 2025-02-03 PROCEDURE — 20610 DRAIN/INJ JOINT/BURSA W/O US: CPT

## 2025-02-03 RX ORDER — ROPIVACAINE HYDROCHLORIDE 2 MG/ML
2 INJECTION, SOLUTION EPIDURAL; INFILTRATION; PERINEURAL ONCE
Status: COMPLETED | OUTPATIENT
Start: 2025-02-03 | End: 2025-02-03

## 2025-02-03 RX ORDER — LIDOCAINE HYDROCHLORIDE 10 MG/ML
5 INJECTION, SOLUTION EPIDURAL; INFILTRATION; INTRACAUDAL; PERINEURAL
Status: COMPLETED | OUTPATIENT
Start: 2025-02-03 | End: 2025-02-03

## 2025-02-03 RX ORDER — METHYLPREDNISOLONE ACETATE 80 MG/ML
80 INJECTION, SUSPENSION INTRA-ARTICULAR; INTRALESIONAL; INTRAMUSCULAR; SOFT TISSUE
Status: COMPLETED | OUTPATIENT
Start: 2025-02-03 | End: 2025-02-03

## 2025-02-03 RX ADMIN — METHYLPREDNISOLONE ACETATE 1 MG: 80 INJECTION, SUSPENSION INTRA-ARTICULAR; INTRALESIONAL; INTRAMUSCULAR; SOFT TISSUE at 15:31

## 2025-02-03 RX ADMIN — IOHEXOL 2 ML: 300 INJECTION, SOLUTION INTRAVENOUS at 15:31

## 2025-02-03 RX ADMIN — ROPIVACAINE HYDROCHLORIDE 2 ML: 2 INJECTION, SOLUTION EPIDURAL; INFILTRATION at 15:31

## 2025-02-03 RX ADMIN — LIDOCAINE HYDROCHLORIDE 3 ML: 10 INJECTION, SOLUTION EPIDURAL; INFILTRATION; INTRACAUDAL; PERINEURAL at 15:31

## 2025-02-24 ENCOUNTER — TELEPHONE (OUTPATIENT)
Age: 55
End: 2025-02-24

## 2025-02-26 ENCOUNTER — OFFICE VISIT (OUTPATIENT)
Dept: OBGYN CLINIC | Facility: MEDICAL CENTER | Age: 55
End: 2025-02-26
Payer: COMMERCIAL

## 2025-02-26 VITALS — BODY MASS INDEX: 19.92 KG/M2 | HEIGHT: 68 IN

## 2025-02-26 DIAGNOSIS — M25.511 RIGHT SHOULDER PAIN, UNSPECIFIED CHRONICITY: ICD-10-CM

## 2025-02-26 DIAGNOSIS — S42.254S CLOSED NONDISPLACED FRACTURE OF GREATER TUBEROSITY OF RIGHT HUMERUS, SEQUELA: Primary | ICD-10-CM

## 2025-02-26 DIAGNOSIS — M75.01 ADHESIVE CAPSULITIS OF RIGHT SHOULDER: ICD-10-CM

## 2025-02-26 PROCEDURE — 99213 OFFICE O/P EST LOW 20 MIN: CPT | Performed by: ORTHOPAEDIC SURGERY
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
## 2025-02-26 NOTE — LETTER
February 26, 2025     Patient: Kathy Aden  YOB: 1970  Date of Visit: 2/26/2025      To Whom it May Concern:    Kathy Aden is under my professional care. Kathy was seen in my office on 2/26/2025. Kathy may return to work on 02/27/2025 with the following restrictions: Light duty only. No lifting, pulling, or pushing with the right upper extremity .    If you have any questions or concerns, please don't hesitate to call.         Sincerely,          Cain Welsh DO        CC: No Recipients

## 2025-04-09 ENCOUNTER — OFFICE VISIT (OUTPATIENT)
Dept: OBGYN CLINIC | Facility: MEDICAL CENTER | Age: 55
End: 2025-04-09
Payer: COMMERCIAL

## 2025-04-09 ENCOUNTER — TELEPHONE (OUTPATIENT)
Age: 55
End: 2025-04-09

## 2025-04-09 VITALS — HEIGHT: 68 IN | BODY MASS INDEX: 19.92 KG/M2

## 2025-04-09 DIAGNOSIS — S43.431A SUPERIOR GLENOID LABRUM LESION OF RIGHT SHOULDER, INITIAL ENCOUNTER: Primary | ICD-10-CM

## 2025-04-09 DIAGNOSIS — Z01.818 PRE-OP TESTING: ICD-10-CM

## 2025-04-09 DIAGNOSIS — S42.254S CLOSED NONDISPLACED FRACTURE OF GREATER TUBEROSITY OF RIGHT HUMERUS, SEQUELA: ICD-10-CM

## 2025-04-09 DIAGNOSIS — M75.01 ADHESIVE CAPSULITIS OF RIGHT SHOULDER: ICD-10-CM

## 2025-04-09 PROCEDURE — 99214 OFFICE O/P EST MOD 30 MIN: CPT | Performed by: ORTHOPAEDIC SURGERY

## 2025-04-09 RX ORDER — TRAMADOL HYDROCHLORIDE 50 MG/1
50 TABLET ORAL EVERY 6 HOURS PRN
OUTPATIENT
Start: 2025-04-09

## 2025-04-09 RX ORDER — ACETAMINOPHEN 325 MG/1
650 TABLET ORAL EVERY 6 HOURS PRN
OUTPATIENT
Start: 2025-04-09

## 2025-04-09 RX ORDER — SODIUM CHLORIDE, SODIUM LACTATE, POTASSIUM CHLORIDE, CALCIUM CHLORIDE 600; 310; 30; 20 MG/100ML; MG/100ML; MG/100ML; MG/100ML
125 INJECTION, SOLUTION INTRAVENOUS CONTINUOUS
OUTPATIENT
Start: 2025-04-09

## 2025-04-09 RX ORDER — TRANEXAMIC ACID 10 MG/ML
1000 INJECTION, SOLUTION INTRAVENOUS ONCE
OUTPATIENT
Start: 2025-04-09 | End: 2025-04-09

## 2025-04-09 RX ORDER — CHLORHEXIDINE GLUCONATE ORAL RINSE 1.2 MG/ML
15 SOLUTION DENTAL ONCE
OUTPATIENT
Start: 2025-04-09 | End: 2025-04-09

## 2025-04-09 RX ORDER — CEFAZOLIN SODIUM 2 G/50ML
2000 SOLUTION INTRAVENOUS ONCE
OUTPATIENT
Start: 2025-04-09 | End: 2025-04-09

## 2025-04-09 NOTE — ASSESSMENT & PLAN NOTE
Assesment:   54 y.o. female right shoulder greater tuberosity fracture with adhesive capsulitis resolved and fracture healed, incidental SLAP tear.     Plan:    Conservative treatment:    Ice to shoulder 1-2 times daily, for 20 minutes at a time.  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       Injection:    No Injection planned at this time.      Surgery:    All of the risks and benefits of operative treatment were explained to the patient, as well as the risks and benefits of any alternative treatment options, including nonoperative care. This risks of surgery include, but are not limited to, infection, bleeding, blood clot, damage to nerves/arteries, need for further surgyer, cardiovascular risk, anesthesia risk, and continued pain.  The patient understood this and elects to proceed forward with surgical intervention.    We will proceed forward with Right shoulder arthroscopy with biceps tenodesis possible open    Patient Denies history of blood clot. Patient denies personal or family history of bleeding or clotting disorder. Patient does not take any blood thinners. Patient denies cardiac history including none. Patient denies  high blood pressure. Patient does not  see a cardiologist. Patient Denies  current history of diabetes. Patient denies allergies to antibiotics. Patient denies history of MRSA infection. Patient Denies current tobacco use. Discussed with patient use of narcotics for post-op pain. Patient denies history of opioid abuse.     Medical clearance will not be obtained. Bloodwork will be obtained. EKG will be obtained.      Follow up:    Return for 1 week post-op erlinda Brown PA-C.    Orders:    Case request operating room: ARTHROSCOPIC BICEPS TENODESIS; Standing    CBC and Platelet; Future    Basic metabolic panel; Future     EKG 12 lead; Future    Ambulatory Referral to Physical Therapy; Future
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
## 2025-04-09 NOTE — TELEPHONE ENCOUNTER
Caller: Self    Doctor: Dr. Welsh    Reason for call: Patient submitted return to work note to employee. They stated it needs to also have date of next appt and intialed by provider. Patient will be dropping off paper this afternoon. She wanted to know if this could be done today or tomorrow    Call back#: [phone]

## 2025-04-10 ENCOUNTER — TELEPHONE (OUTPATIENT)
Dept: OBGYN CLINIC | Facility: MEDICAL CENTER | Age: 55
End: 2025-04-10

## 2025-04-10 NOTE — TELEPHONE ENCOUNTER
Linda just called to check on the paper that needed additional info that she dropped off yesterday. Advised doctor does not come in until noon. She asked if someone could call her when completed so she can  today. Thank  you.    Call back# [phone]   difficulty breathing

## 2025-04-10 NOTE — TELEPHONE ENCOUNTER
Caller: Patient    Doctor: Dr. Welsh    Reason for call: Patient called back to check status of work note, please call patient once work note has been completed.    Call back#: [phone]

## 2025-04-11 ENCOUNTER — OFFICE VISIT (OUTPATIENT)
Dept: URGENT CARE | Facility: MEDICAL CENTER | Age: 55
End: 2025-04-11
Payer: COMMERCIAL

## 2025-04-11 VITALS
RESPIRATION RATE: 16 BRPM | SYSTOLIC BLOOD PRESSURE: 136 MMHG | HEART RATE: 88 BPM | OXYGEN SATURATION: 100 % | DIASTOLIC BLOOD PRESSURE: 84 MMHG | TEMPERATURE: 98.4 F

## 2025-04-11 DIAGNOSIS — H61.23 BILATERAL IMPACTED CERUMEN: Primary | ICD-10-CM

## 2025-04-11 PROCEDURE — 99213 OFFICE O/P EST LOW 20 MIN: CPT

## 2025-04-11 PROCEDURE — 69209 REMOVE IMPACTED EAR WAX UNI: CPT

## 2025-04-11 NOTE — PATIENT INSTRUCTIONS
"Patient Education     Ear wax impaction   The Basics   Written by the doctors and editors at [facility]   What is ear wax impaction? -- Ear wax impaction is when ear wax builds up enough to cause symptoms. Normally, ear wax helps to protect the insides of the ears and prevents injury or infection (figure 1). But having too much ear wax can cause symptoms like trouble hearing and sometimes pain. The medical term for ear wax is \"cerumen.\"  Young children and older adults are more likely than others to have ear wax impaction.  What causes ear wax impaction? -- Several different things can cause ear wax impaction:   Diseases that affect the ear - Some health problems can affect the shape of the inside of the ear, and make it hard for wax to move out. For example, skin problems that cause skin cells to shed a lot can lead to wax buildup in the ears.   Narrow ear canal (figure 2) - In some people, the ear canals are narrower than in others. These people might be more likely to have ear wax impaction. A person's ear canal can become narrower after an ear injury or after severe or multiple ear infections.   Changes in ear wax and lining due to aging - As people get older, their ear wax gets harder and thicker. This makes it more difficult for the wax to move out of the ear as it normally should.   Ear-cleaning habits - Some people try to clean their ears using cotton swabs (Q-Tips) or other tools. This can actually push the wax deeper into the ear instead of getting it out. Over time, this can cause ear wax impaction.   Making too much ear wax - Some people make more ear wax than others. This can happen when water gets trapped in the ear, or when the ear is injured. But some people just have a lot of ear wax for no obvious reason.   Using devices that go into the ear - Hearing aids, \"ear bud\"-style headphones, and ear plugs can cause ear wax impaction if they are used over a long period of time.  What are the symptoms of ear " "wax impaction? -- The symptoms include:   Trouble hearing   Pain in the ear   Feeling like the ear is blocked or plugged   Hearing a ringing noise in the ear  These symptoms can happen in 1 or both ears.  Should I see a doctor or nurse? -- Yes. If you or your child have any of these symptoms, see a doctor or nurse. They can check the insides of the ears to figure out if the symptoms are caused by ear wax impaction or another problem, such as an ear infection.  Should I clean my (or my child's) ears at home? -- No. The insides of the ears do not usually need to be cleaned. Sticking anything into the ears can push the wax in deeper and cause impaction.  \"Ear candling\" involves lighting 1 end of a hollow candle, and putting the other end in the ear. Ear candling is not recommended for ear wax removal. It does not remove ear wax and can even cause ear injuries and burns.  How is ear wax impaction treated? -- There are several treatments to remove impacted ear wax. Doctors and nurses offer these treatments only to people who have bothersome symptoms. They do not recommend treatments for removing ear wax in people who have no symptoms, even if they have ear wax impaction.  In some cases, doctors and nurses will remove ear wax in people whose ears are impacted and who aren't able to let others know if they have symptoms or not. This can include young children, and people who are confused or have trouble communicating, including some older adults.  There are several different ways to remove ear wax:   Ear drops - Special ear drops can soften ear wax and help it to drain out. Ear drops are not usually safe for people with an ear infection or damage to the eardrum.   Rinsing - In some cases, a doctor or nurse can remove impacted ear wax by squirting water (or another liquid) into the ear to rinse it out.   Special tools - A doctor or nurse might use a special tool to remove ear wax. There are different types of tools that can " do this safely. These include small sticks, hooks, and spoons. There are also tools that use suction to pull the wax out.  Can ear wax impaction be prevented? -- It depends. If you have repeated problems with ear wax impaction, talk to your doctor or nurse. They might be able to suggest ways to help prevent future impactions. For example, they might suggest using mineral oil to soften the ear wax, removing hearing aids overnight if you use them, or getting your ears cleaned regularly by a doctor or nurse.  What problems should I watch for? -- Call for advice if:   You have signs of infection - These include fever of 100.4°F (38°C) or higher or chills.   Your ear is bleeding or draining pus.   You have pain, ringing in the ear, or hearing loss that is new or worse than before.   Your symptoms are not getting better after a few days, if you are using ear drop treatments.  All topics are updated as new evidence becomes available and our peer review process is complete.  This topic retrieved from [employer] on: Feb 26, 2024.  Topic 70461 Version 16.0  Release: 32.2.4 - C32.56  © 2024 UpToDate, Inc. and/or its affiliates. All rights reserved.  figure 1: Ear wax impaction     This drawing shows where ear wax can build up (become impacted) in the ear canal.  Graphic 51366 Version 2.0  figure 2: Normal ear     This figure shows the normal parts of the outer, middle, and inner ear.  Graphic 14552 Version 5.0  Consumer Information Use and Disclaimer   Disclaimer: This generalized information is a limited summary of diagnosis, treatment, and/or medication information. It is not meant to be comprehensive and should be used as a tool to help the user understand and/or assess potential diagnostic and treatment options. It does NOT include all information about conditions, treatments, medications, side effects, or risks that may apply to a specific patient. It is not intended to be medical advice or a substitute for the medical  advice, diagnosis, or treatment of a health care provider based on the health care provider's examination and assessment of a patient's specific and unique circumstances. Patients must speak with a health care provider for complete information about their health, medical questions, and treatment options, including any risks or benefits regarding use of medications. This information does not endorse any treatments or medications as safe, effective, or approved for treating a specific patient. UpToDate, Inc. and its affiliates disclaim any warranty or liability relating to this information or the use thereof.The use of this information is governed by the Terms of Use, available at https://www.[employer].com/en/know/clinical-effectiveness-terms. 2024© UpToDate, Inc. and its affiliates and/or licensors. All rights reserved.  Copyright   © 2024 UpToDate, Inc. and/or its affiliates. All rights reserved.
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
## 2025-04-26 ENCOUNTER — APPOINTMENT (OUTPATIENT)
Dept: LAB | Facility: MEDICAL CENTER | Age: 55
End: 2025-04-26
Payer: COMMERCIAL

## 2025-04-26 ENCOUNTER — APPOINTMENT (OUTPATIENT)
Dept: LAB | Facility: MEDICAL CENTER | Age: 55
End: 2025-04-26
Attending: PHYSICIAN ASSISTANT
Payer: COMMERCIAL

## 2025-04-26 DIAGNOSIS — S43.431A SUPERIOR GLENOID LABRUM LESION OF RIGHT SHOULDER, INITIAL ENCOUNTER: ICD-10-CM

## 2025-04-26 DIAGNOSIS — Z01.818 PRE-OP TESTING: ICD-10-CM

## 2025-04-26 LAB
ANION GAP SERPL CALCULATED.3IONS-SCNC: 8 MMOL/L (ref 4–13)
ATRIAL RATE: 57 BPM
BUN SERPL-MCNC: 15 MG/DL (ref 5–25)
CALCIUM SERPL-MCNC: 9.6 MG/DL (ref 8.4–10.2)
CHLORIDE SERPL-SCNC: 103 MMOL/L (ref 96–108)
CO2 SERPL-SCNC: 28 MMOL/L (ref 21–32)
CREAT SERPL-MCNC: 0.77 MG/DL (ref 0.6–1.3)
ERYTHROCYTE [DISTWIDTH] IN BLOOD BY AUTOMATED COUNT: 13.2 % (ref 11.6–15.1)
GFR SERPL CREATININE-BSD FRML MDRD: 86 ML/MIN/1.73SQ M
GLUCOSE P FAST SERPL-MCNC: 106 MG/DL (ref 65–99)
HCT VFR BLD AUTO: 47 % (ref 34.8–46.1)
HGB BLD-MCNC: 15.2 G/DL (ref 11.5–15.4)
MCH RBC QN AUTO: 31.4 PG (ref 26.8–34.3)
MCHC RBC AUTO-ENTMCNC: 32.3 G/DL (ref 31.4–37.4)
MCV RBC AUTO: 97 FL (ref 82–98)
P AXIS: 55 DEGREES
PLATELET # BLD AUTO: 318 THOUSANDS/UL (ref 149–390)
PMV BLD AUTO: 10.1 FL (ref 8.9–12.7)
POTASSIUM SERPL-SCNC: 4.1 MMOL/L (ref 3.5–5.3)
PR INTERVAL: 206 MS
QRS AXIS: 70 DEGREES
QRSD INTERVAL: 80 MS
QT INTERVAL: 446 MS
QTC INTERVAL: 434 MS
RBC # BLD AUTO: 4.84 MILLION/UL (ref 3.81–5.12)
SODIUM SERPL-SCNC: 139 MMOL/L (ref 135–147)
T WAVE AXIS: 8 DEGREES
VENTRICULAR RATE: 57 BPM
WBC # BLD AUTO: 5.27 THOUSAND/UL (ref 4.31–10.16)

## 2025-04-26 PROCEDURE — 85027 COMPLETE CBC AUTOMATED: CPT

## 2025-04-26 PROCEDURE — 36415 COLL VENOUS BLD VENIPUNCTURE: CPT

## 2025-04-26 PROCEDURE — 80048 BASIC METABOLIC PNL TOTAL CA: CPT

## 2025-04-26 PROCEDURE — 93010 ELECTROCARDIOGRAM REPORT: CPT | Performed by: INTERNAL MEDICINE

## 2025-04-30 ENCOUNTER — DOCUMENTATION (OUTPATIENT)
Dept: ADMINISTRATIVE | Facility: OTHER | Age: 55
End: 2025-04-30

## 2025-04-30 NOTE — PROGRESS NOTES
04/30/25 10:41 AM    Osteoporosis Management Post Fracture outreach is not required; there is an active DEXA screening order on file.    Thank you.  FRANCISCO J TREJO MA  [facility]

## 2025-05-14 ENCOUNTER — ANESTHESIA EVENT (OUTPATIENT)
Age: 55
End: 2025-05-14

## 2025-05-14 ENCOUNTER — ANESTHESIA (OUTPATIENT)
Age: 55
End: 2025-05-14

## 2025-05-19 NOTE — PRE-PROCEDURE INSTRUCTIONS
No outpatient medications have been marked as taking for the 5/29/25 encounter (Hospital Encounter).    Medication instructions for day of surgery reviewed. Please take all instructed medications with only a sip of water.       You will receive a call one business day prior to surgery with an arrival time and hospital directions. If your surgery is scheduled on a Monday, the hospital will be calling you on the Friday prior to your surgery. If you have not heard from anyone by 8pm, please call the hospital supervisor through the hospital  at [phone]. ([place] [phone] or [place] [phone]).    Do not eat or drink anything after midnight the night before your surgery, including candy, mints, lifesavers, or chewing gum. Do not drink alcohol 24hrs before your surgery. Try not to smoke at least 24hrs before your surgery.       Follow the pre surgery showering instructions as listed in the “My Surgical Experience Booklet” or otherwise provided by your surgeon's office. Do not use a blade to shave the surgical area 1 week before surgery. It is okay to use a clean electric clippers up to 24 hours before surgery. Do not apply any lotions, creams, including makeup, cologne, deodorant, or perfumes after showering on the day of your surgery. Do not use dry shampoo, hair spray, hair gel, or any type of hair products.     No contact lenses, eye make-up, or artificial eyelashes. Remove nail polish, including gel polish, and any artificial, gel, or acrylic nails if possible. Remove all jewelry including rings and body piercing jewelry.     Wear causal clothing that is easy to take on and off. Consider your type of surgery.    Keep any valuables, jewelry, piercings at home. Please bring any specially ordered equipment (sling, braces) if indicated.    Arrange for a responsible person to drive you to and from the hospital on the day of your surgery. Please confirm the visitor policy for the day of your procedure  when you receive your phone call with an arrival time.     Call the surgeon's office with any new illnesses, exposures, or additional questions prior to surgery.    Please reference your “My Surgical Experience Booklet” for additional information to prepare for your upcoming surgery.

## 2025-05-27 ENCOUNTER — VBI (OUTPATIENT)
Dept: ADMINISTRATIVE | Facility: OTHER | Age: 55
End: 2025-05-27

## 2025-05-27 NOTE — TELEPHONE ENCOUNTER
05/27/25 3:34 PM     Chart reviewed for CRC: Colonoscopy ; nothing is submitted to the patient's insurance at this time.     Joselin Moreno MA   [facility]

## 2025-05-28 ENCOUNTER — ANESTHESIA EVENT (OUTPATIENT)
Age: 55
End: 2025-05-28
Payer: COMMERCIAL

## 2025-05-28 RX ORDER — SODIUM CHLORIDE, SODIUM LACTATE, POTASSIUM CHLORIDE, CALCIUM CHLORIDE 600; 310; 30; 20 MG/100ML; MG/100ML; MG/100ML; MG/100ML
125 INJECTION, SOLUTION INTRAVENOUS CONTINUOUS
Status: CANCELLED | OUTPATIENT
Start: 2025-05-28

## 2025-05-28 NOTE — ANESTHESIA PREPROCEDURE EVALUATION
Procedure:  ARTHROSCOPIC BICEPS TENODESIS (Right: Shoulder)    Relevant Problems   ANESTHESIA (within normal limits)      Rheumatology   (+) Superior glenoid labrum lesion of right shoulder        Physical Exam    Airway     Mallampati score: II  TM Distance: >3 FB  Neck ROM: full      Cardiovascular  Rhythm: regular, Rate: normalCardiovascular exam normal    Dental   No notable dental hx     Pulmonary  Pulmonary exam normal Breath sounds clear to auscultation    Neurological  - normal exam  She appears awake, alert and oriented x3.      Other Findings  post-pubertal.      Anesthesia Plan  ASA Score- 1     Anesthesia Type- general with ASA Monitors.         Additional Monitors:     Airway Plan: Oral ETT.           Plan Factors-    Chart reviewed.   Existing labs reviewed. Patient summary reviewed.                  Induction- intravenous.    Postoperative Plan- .   Monitoring Plan - Monitoring plan - standard ASA monitoring  Post Operative Pain Plan - multimodal analgesia        Informed Consent-       NPO Status:  No vitals data found for the desired time range.

## 2025-05-29 ENCOUNTER — ANESTHESIA (OUTPATIENT)
Age: 55
End: 2025-05-29
Payer: COMMERCIAL

## 2025-05-29 ENCOUNTER — HOSPITAL ENCOUNTER (OUTPATIENT)
Age: 55
Setting detail: OUTPATIENT SURGERY
Discharge: HOME | End: 2025-05-29
Attending: ORTHOPAEDIC SURGERY | Admitting: ORTHOPAEDIC SURGERY
Payer: COMMERCIAL

## 2025-05-29 VITALS
OXYGEN SATURATION: 100 % | HEIGHT: 68 IN | SYSTOLIC BLOOD PRESSURE: 119 MMHG | BODY MASS INDEX: 19.7 KG/M2 | WEIGHT: 130 LBS | DIASTOLIC BLOOD PRESSURE: 71 MMHG | TEMPERATURE: 97.3 F | HEART RATE: 49 BPM | RESPIRATION RATE: 22 BRPM

## 2025-05-29 DIAGNOSIS — S43.431D SUPERIOR GLENOID LABRUM LESION OF RIGHT SHOULDER, SUBSEQUENT ENCOUNTER: Primary | ICD-10-CM

## 2025-05-29 DIAGNOSIS — Z98.890 S/P SHOULDER SURGERY: ICD-10-CM

## 2025-05-29 PROCEDURE — 29828 SHO ARTHRS SRG BICP TENODSIS: CPT | Performed by: ORTHOPAEDIC SURGERY

## 2025-05-29 PROCEDURE — 29828 SHO ARTHRS SRG BICP TENODSIS: CPT | Performed by: PHYSICIAN ASSISTANT

## 2025-05-29 PROCEDURE — NC001 PR NO CHARGE: Performed by: ORTHOPAEDIC SURGERY

## 2025-05-29 PROCEDURE — C1713 ANCHOR/SCREW BN/BN,TIS/BN: HCPCS | Performed by: ORTHOPAEDIC SURGERY

## 2025-05-29 DEVICE — BIO-COMP-SITE P-LCK 2.9X15.5MM
Type: IMPLANTABLE DEVICE | Site: SHOULDER | Status: FUNCTIONAL
Brand: ARTHREX®

## 2025-05-29 RX ORDER — HYDROMORPHONE HCL IN WATER/PF 6 MG/30 ML
0.2 PATIENT CONTROLLED ANALGESIA SYRINGE INTRAVENOUS
Status: DISCONTINUED | OUTPATIENT
Start: 2025-05-29 | End: 2025-05-29 | Stop reason: HOSPADM

## 2025-05-29 RX ORDER — CEFAZOLIN SODIUM 2 G/50ML
2000 SOLUTION INTRAVENOUS ONCE
Status: COMPLETED | OUTPATIENT
Start: 2025-05-29 | End: 2025-05-29

## 2025-05-29 RX ORDER — HYDROMORPHONE HCL/PF 1 MG/ML
0.5 SYRINGE (ML) INJECTION
Status: DISCONTINUED | OUTPATIENT
Start: 2025-05-29 | End: 2025-05-29 | Stop reason: HOSPADM

## 2025-05-29 RX ORDER — METOCLOPRAMIDE HYDROCHLORIDE 5 MG/ML
10 INJECTION INTRAMUSCULAR; INTRAVENOUS ONCE AS NEEDED
Status: DISCONTINUED | OUTPATIENT
Start: 2025-05-29 | End: 2025-05-29 | Stop reason: HOSPADM

## 2025-05-29 RX ORDER — BUPIVACAINE HYDROCHLORIDE 5 MG/ML
INJECTION, SOLUTION EPIDURAL; INTRACAUDAL; PERINEURAL
Status: COMPLETED | OUTPATIENT
Start: 2025-05-29 | End: 2025-05-29

## 2025-05-29 RX ORDER — FENTANYL CITRATE 50 UG/ML
INJECTION, SOLUTION INTRAMUSCULAR; INTRAVENOUS AS NEEDED
Status: DISCONTINUED | OUTPATIENT
Start: 2025-05-29 | End: 2025-05-29

## 2025-05-29 RX ORDER — MIDAZOLAM HYDROCHLORIDE 2 MG/2ML
INJECTION, SOLUTION INTRAMUSCULAR; INTRAVENOUS
Status: COMPLETED | OUTPATIENT
Start: 2025-05-29 | End: 2025-05-29

## 2025-05-29 RX ORDER — FENTANYL CITRATE/PF 50 MCG/ML
25 SYRINGE (ML) INJECTION
Status: DISCONTINUED | OUTPATIENT
Start: 2025-05-29 | End: 2025-05-29 | Stop reason: HOSPADM

## 2025-05-29 RX ORDER — PROMETHAZINE HYDROCHLORIDE 25 MG/ML
6.25 INJECTION, SOLUTION INTRAMUSCULAR; INTRAVENOUS ONCE AS NEEDED
Status: DISCONTINUED | OUTPATIENT
Start: 2025-05-29 | End: 2025-05-29 | Stop reason: HOSPADM

## 2025-05-29 RX ORDER — SODIUM CHLORIDE, SODIUM LACTATE, POTASSIUM CHLORIDE, CALCIUM CHLORIDE 600; 310; 30; 20 MG/100ML; MG/100ML; MG/100ML; MG/100ML
INJECTION, SOLUTION INTRAVENOUS CONTINUOUS PRN
Status: DISCONTINUED | OUTPATIENT
Start: 2025-05-29 | End: 2025-05-29

## 2025-05-29 RX ORDER — TRAMADOL HYDROCHLORIDE 50 MG/1
50 TABLET ORAL EVERY 6 HOURS PRN
Status: DISCONTINUED | OUTPATIENT
Start: 2025-05-29 | End: 2025-05-29 | Stop reason: HOSPADM

## 2025-05-29 RX ORDER — ALBUTEROL SULFATE 0.83 MG/ML
2.5 SOLUTION RESPIRATORY (INHALATION) ONCE AS NEEDED
Status: DISCONTINUED | OUTPATIENT
Start: 2025-05-29 | End: 2025-05-29 | Stop reason: HOSPADM

## 2025-05-29 RX ORDER — ACETAMINOPHEN 325 MG/1
650 TABLET ORAL EVERY 6 HOURS PRN
Status: DISCONTINUED | OUTPATIENT
Start: 2025-05-29 | End: 2025-05-29 | Stop reason: HOSPADM

## 2025-05-29 RX ORDER — HYDRALAZINE HYDROCHLORIDE 20 MG/ML
5 INJECTION INTRAMUSCULAR; INTRAVENOUS
Status: DISCONTINUED | OUTPATIENT
Start: 2025-05-29 | End: 2025-05-29 | Stop reason: HOSPADM

## 2025-05-29 RX ORDER — SODIUM CHLORIDE, SODIUM LACTATE, POTASSIUM CHLORIDE, CALCIUM CHLORIDE 600; 310; 30; 20 MG/100ML; MG/100ML; MG/100ML; MG/100ML
125 INJECTION, SOLUTION INTRAVENOUS CONTINUOUS
Status: DISCONTINUED | OUTPATIENT
Start: 2025-05-29 | End: 2025-05-29 | Stop reason: HOSPADM

## 2025-05-29 RX ORDER — OXYCODONE HYDROCHLORIDE 5 MG/1
5 TABLET ORAL EVERY 4 HOURS PRN
Refills: 0 | Status: DISCONTINUED | OUTPATIENT
Start: 2025-05-29 | End: 2025-05-29 | Stop reason: HOSPADM

## 2025-05-29 RX ORDER — PROPOFOL 10 MG/ML
INJECTION, EMULSION INTRAVENOUS AS NEEDED
Status: DISCONTINUED | OUTPATIENT
Start: 2025-05-29 | End: 2025-05-29

## 2025-05-29 RX ORDER — LABETALOL HYDROCHLORIDE 5 MG/ML
10 INJECTION, SOLUTION INTRAVENOUS
Status: DISCONTINUED | OUTPATIENT
Start: 2025-05-29 | End: 2025-05-29 | Stop reason: HOSPADM

## 2025-05-29 RX ORDER — CHLORHEXIDINE GLUCONATE ORAL RINSE 1.2 MG/ML
15 SOLUTION DENTAL ONCE
Status: COMPLETED | OUTPATIENT
Start: 2025-05-29 | End: 2025-05-29

## 2025-05-29 RX ORDER — ONDANSETRON 2 MG/ML
INJECTION INTRAMUSCULAR; INTRAVENOUS AS NEEDED
Status: DISCONTINUED | OUTPATIENT
Start: 2025-05-29 | End: 2025-05-29

## 2025-05-29 RX ORDER — TRANEXAMIC ACID 10 MG/ML
1000 INJECTION, SOLUTION INTRAVENOUS ONCE
Status: COMPLETED | OUTPATIENT
Start: 2025-05-29 | End: 2025-05-29

## 2025-05-29 RX ORDER — GLYCOPYRROLATE 0.2 MG/ML
INJECTION INTRAMUSCULAR; INTRAVENOUS AS NEEDED
Status: DISCONTINUED | OUTPATIENT
Start: 2025-05-29 | End: 2025-05-29

## 2025-05-29 RX ORDER — PROPOFOL 10 MG/ML
INJECTION, EMULSION INTRAVENOUS CONTINUOUS PRN
Status: DISCONTINUED | OUTPATIENT
Start: 2025-05-29 | End: 2025-05-29

## 2025-05-29 RX ORDER — OXYCODONE HYDROCHLORIDE 5 MG/1
5 TABLET ORAL EVERY 4 HOURS PRN
Qty: 15 TABLET | Refills: 0 | Status: SHIPPED | OUTPATIENT
Start: 2025-05-29 | End: 2025-06-04 | Stop reason: ALTCHOICE

## 2025-05-29 RX ORDER — MAGNESIUM HYDROXIDE 1200 MG/15ML
LIQUID ORAL AS NEEDED
Status: DISCONTINUED | OUTPATIENT
Start: 2025-05-29 | End: 2025-05-29 | Stop reason: HOSPADM

## 2025-05-29 RX ORDER — ONDANSETRON 2 MG/ML
4 INJECTION INTRAMUSCULAR; INTRAVENOUS ONCE AS NEEDED
Status: DISCONTINUED | OUTPATIENT
Start: 2025-05-29 | End: 2025-05-29 | Stop reason: HOSPADM

## 2025-05-29 RX ORDER — DEXAMETHASONE SODIUM PHOSPHATE 10 MG/ML
INJECTION, SOLUTION INTRAMUSCULAR; INTRAVENOUS AS NEEDED
Status: DISCONTINUED | OUTPATIENT
Start: 2025-05-29 | End: 2025-05-29

## 2025-05-29 RX ORDER — ROCURONIUM BROMIDE 10 MG/ML
INJECTION, SOLUTION INTRAVENOUS AS NEEDED
Status: DISCONTINUED | OUTPATIENT
Start: 2025-05-29 | End: 2025-05-29

## 2025-05-29 RX ADMIN — PROPOFOL 150 MG: 10 INJECTION, EMULSION INTRAVENOUS at 07:43

## 2025-05-29 RX ADMIN — CEFAZOLIN SODIUM 2000 MG: 2 SOLUTION INTRAVENOUS at 07:57

## 2025-05-29 RX ADMIN — BUPIVACAINE HYDROCHLORIDE 10 ML: 5 INJECTION, SOLUTION EPIDURAL; INTRACAUDAL; PERINEURAL at 07:10

## 2025-05-29 RX ADMIN — TRANEXAMIC ACID 1000 MG: 10 INJECTION, SOLUTION INTRAVENOUS at 08:00

## 2025-05-29 RX ADMIN — DEXAMETHASONE SODIUM PHOSPHATE 10 MG: 10 INJECTION INTRAMUSCULAR; INTRAVENOUS at 07:49

## 2025-05-29 RX ADMIN — GLYCOPYRROLATE 0.2 MG: 0.2 INJECTION INTRAMUSCULAR; INTRAVENOUS at 07:52

## 2025-05-29 RX ADMIN — SODIUM CHLORIDE, SODIUM LACTATE, POTASSIUM CHLORIDE, AND CALCIUM CHLORIDE: .6; .31; .03; .02 INJECTION, SOLUTION INTRAVENOUS at 07:39

## 2025-05-29 RX ADMIN — SODIUM CHLORIDE, SODIUM LACTATE, POTASSIUM CHLORIDE, AND CALCIUM CHLORIDE 125 ML/HR: .6; .31; .03; .02 INJECTION, SOLUTION INTRAVENOUS at 06:28

## 2025-05-29 RX ADMIN — ROCURONIUM BROMIDE 50 MG: 10 INJECTION, SOLUTION INTRAVENOUS at 07:43

## 2025-05-29 RX ADMIN — SUGAMMADEX 200 MG: 100 INJECTION, SOLUTION INTRAVENOUS at 08:45

## 2025-05-29 RX ADMIN — PROPOFOL 100 MCG/KG/MIN: 10 INJECTION, EMULSION INTRAVENOUS at 08:00

## 2025-05-29 RX ADMIN — PHENYLEPHRINE HYDROCHLORIDE 20 MCG/MIN: 10 INJECTION INTRAVENOUS at 08:00

## 2025-05-29 RX ADMIN — BUPIVACAINE 20 ML: 13.3 INJECTION, SUSPENSION, LIPOSOMAL INFILTRATION at 07:10

## 2025-05-29 RX ADMIN — MIDAZOLAM 2 MG: 1 INJECTION INTRAMUSCULAR; INTRAVENOUS at 07:08

## 2025-05-29 RX ADMIN — FENTANYL CITRATE 100 MCG: 50 INJECTION INTRAMUSCULAR; INTRAVENOUS at 07:43

## 2025-05-29 RX ADMIN — ONDANSETRON 4 MG: 2 INJECTION INTRAMUSCULAR; INTRAVENOUS at 07:57

## 2025-05-29 RX ADMIN — CHLORHEXIDINE GLUCONATE 15 ML: 1.2 SOLUTION ORAL at 06:17

## 2025-05-29 NOTE — OP NOTE
OPERATIVE REPORT  PATIENT NAME: Kathy Aden    :  1970  MRN: [id number]  Pt Location: [facility] OR ROOM 03    SURGERY DATE: 2025    Surgeons and Role:     * Cain Welsh DO - Primary    Assist: Stephanie Gregg PA-C    Preop Diagnosis:  Superior glenoid labrum lesion of right shoulder, initial encounter [S43.431A]    Post-Op Diagnosis Codes:     * Superior glenoid labrum lesion of right shoulder, initial encounter [S43.431A]    Procedure(s):  Right - ARTHROSCOPIC BICEPS TENODESIS    Specimen(s):  * No specimens in log *    Estimated Blood Loss:   Minimal    Drains:  * No LDAs found *    Anesthesia Type:   General    Operative Indications:  Superior glenoid labrum lesion of right shoulder, initial encounter [S43.431A]     Complications:   None    Procedure and Technique:        Operation:    1. Surgical arthroscopy of the right shoulder with arthroscopic tenodesis of biceps long head    Findings:    Arthroscopic evaluation of the shoulder revealed the following:   Glenoid articular surface: Minimal grade II chondral defects.   Humeral head articular surface: No notable chondral defects except for some abrasion adjacent to the long head of the biceps tendon.   Labrum: Detached superior labral complex from the glenoid rim. Specifically, this separation extended from the anterior insertion of the long head of the biceps tendon to approximately the 10 o'clock position. The labrum in this region was grossly unstable to probing. Further, some radial tearing and central fraying was noted in this region.   Biceps tendon: Significant tendinopathy with severe inflammation and hyperemia evident, especially on the anterior surface near the bicipital groove.   Rotator cuff: Entirely intact without evidence of disruption on the articular or bursal surfaces.   Inferior recess: No loose bodies or ligament disruption.   Subacromial bursa: Markedly hypertrophic and hyperemic.   Acromion: The anterior aspect of the  acromion displayed an inferiorly directed spur.   Distal clavicle:Normal    Procedure:    In the pre-operative holding area, the patient identified the correct operative extremity and I marked that extremity with my initials, using a permanent marker. The patient was then brought to the operating room and positioned supine. Following satisfactory induction of anesthesia, the patient was positioned in the lateral position. The entire operative extremity was draped free and the right shoulder was prepped in the usual sterile fashion for surgery of the shoulder. Before any surgical instrumentation was passed to me by the surgical technician, a formalized time-out occurred, which involves the surgeon, circulating nurse, and anesthesia staff all verifying the correct operative extremity. My initials were visible on the prepped and draped operative field.     The anatomic landmarks of the scapular spine, acromion, clavicle, and coracoid process were marked. Subsequently, a posterior portal was marked in the soft recess between the glenoid and humeral head. The posterior portal was established with a scalpel. The arthroscope was introduced through this portal. Under direct visualization, the anterior portal was established with a localizing needle followed by a scalpel. A probe was then introduced into the anterior portal. A systematic diagnostic arthroscopy evaluated the following: glenoid articular surface, humeral head articular surface, labrum, biceps tendon, rotator cuff, and inferior recess. The arthroscope was withdrawn from the glenohumeral joint and redirected into the subacromial bursa. Under direct visualization, the lateral portal was established with a localizing needle followed by a scalpel. A shaver was introduced into the subacromial space and the space was further established. The acromion, distal clavicle, and rotator cuff were arthroscopically evaluated.    The superior labral complex had detached from  the glenoid rim. Specifically, this separation extended from the anterior insertion of the long head of the biceps tendon to approximately the 10 o'clock position. The labrum in this region was grossly unstable to probing. Further, some radial tearing and central fraying was noted in this region. Consequently, the superior labrum was gently debrided with the motorized shaver.  A birds beak instrument was used to pass a cinch stitch around the proximal biceps tendon, in a loop n gracie fashion.  The labral attachment of the long head of the biceps tendon was detached using a mets.  Electrocautery was then used to debride any fraying of the labrum.    A 2.9 mm push lock anchor was then inserted to secure the biceps tendon into the biceps groove just above the subscapularis tendon, completing the arthroscopic biceps tenodesis.    There was no additional pathology. All particulate debris was removed. The shoulder was copiously rinsed and then drained. The portals were closed with buried 3-0 monocryl suture, using a subcutaneous technique. The skin was cleansed with sterile saline and dried before Steri-Strips were applied. Finally, a sterile dressing was secured by tape, followed by a shoulder brace.      I was present for the entire procedure., A qualified resident physician was not available., and A physician assistant was required during the procedure for retraction, tissue handling, dissection and suturing.    Patient Disposition:  PACU          SIGNATURE: Cain Welsh DO  DATE: May 29, 2025  TIME: 7:47 AM

## 2025-05-29 NOTE — ANESTHESIA POSTPROCEDURE EVALUATION
Post-Op Assessment Note    Last Filed PACU Vitals:  Vitals Value Taken Time   Temp 98 °F (36.7 °C) 05/29/25 09:30   Pulse 54 05/29/25 09:30   /59 05/29/25 09:30   Resp 26 05/29/25 09:30   SpO2 100 % 05/29/25 09:30       Modified Melanie:     Vitals Value Taken Time   Activity 2 05/29/25 09:30   Respiration 2 05/29/25 09:30   Circulation 0 05/29/25 09:30   Consciousness 2 05/29/25 09:30   Oxygen Saturation 2 05/29/25 09:30     Modified Melanie Score: 8

## 2025-05-29 NOTE — ANESTHESIA POSTPROCEDURE EVALUATION
Post-Op Assessment Note      Pain management: adequate       Mental Status:  Sleepy   Hydration Status:  Stable   PONV Controlled:  None   Airway Patency:  Patent  Two or more mitigation strategies used for obstructive sleep apnea   Post Op Vitals Reviewed: Yes    No anethesia notable event occurred.    Staff: CRNA   Comments: Small abrasion noted on patients upper right lip where bite block (possibly from tape) had been placed, no swelling or bruising noted.        Last Filed PACU Vitals:  Vitals Value Taken Time   Temp 97 °F (36.1 °C) 05/29/25 09:00   Pulse 73 05/29/25 09:01   /63 05/29/25 09:00   Resp 17 05/29/25 09:01   SpO2 100 % 05/29/25 09:01   Vitals shown include unfiled device data.

## 2025-05-29 NOTE — ANESTHESIA PROCEDURE NOTES
Peripheral Block    Patient location during procedure: holding area  Start time: 5/29/2025 7:10 AM  Reason for block: at surgeon's request and post-op pain management  Staffing  Performed by: Heri Fields MD  Authorized by: Heri Fields MD    Preanesthetic Checklist  Completed: patient identified, IV checked, site marked, risks and benefits discussed, surgical consent, monitors and equipment checked, pre-op evaluation and timeout performed  Peripheral Block  Patient position: supine  Prep: ChloraPrep  Patient monitoring: frequent blood pressure checks, continuous pulse oximetry and heart rate  Block type: Supraclavicular  Laterality: right  Injection technique: single-shot  Procedures: ultrasound guided, Ultrasound guidance required for the procedure to increase accuracy and safety of medication placement and decrease risk of complications.  Ultrasound permanent image saved  bupivacaine (PF) (MARCAINE) 0.5 % injection 20 mL - Perineural   10 mL - 5/29/2025 7:10:00 AM  bupivacaine liposomal (EXPAREL) 1.3 % injection 20 mL - Perineural   20 mL - 5/29/2025 7:10:00 AM  midazolam (VERSED) injection 0.5 mg - Intravenous   2 mg - 5/29/2025 7:08:00 AM  Needle  Needle type: Stimuplex   Needle gauge: 20 G  Needle length: 4 in  Needle localization: anatomical landmarks and ultrasound guidance  Assessment  Injection assessment: incremental injection, frequent aspiration, injected with ease, negative aspiration, negative for heart rate change, no paresthesia on injection, no symptoms of intraneural/intravenous injection and needle tip visualized at all times  Paresthesia pain: none  Post-procedure:  site cleaned  patient tolerated the procedure well with no immediate complications

## 2025-05-29 NOTE — DISCHARGE INSTR - AVS FIRST PAGE
POSTOPERATIVE INSTRUCTIONS following SHOULDER SURGERY    MEDICATIONS:  Resume all home medications unless otherwise instructed by your surgeon.  Pain Medication:  Oxycodone 5 mg, 1 tablet every 4 hours as needed  If you were given a regional anesthetic (nerve block), please begin taking the pain medication as soon as you get home, even if you have minimal or no pain.  DO NOT WAIT FOR THE NERVE BLOCK TO WEAR OFF.  Possible side effects include nausea, constipation, and urinary retention.  If you experience these side effects, please call our office for assistance.  Pain med refills are authorized only during office hours (8am-4pm, Mon-Fri).  Anti-Inflammatory:  Tylenol 325 mg, 1-2 tablets every 6 hours and Ibuprofen 600 mg, 1 tablet every 8 hours  TAKE WITH FOOD.  Stop if you experience nausea, reflux, or stomach pain.  Nausea Medication:  None  Fill prescription ONLY if you expericnce severe nausea.    WOUND CARE:  Keep the dressing clean and dry.  Light drainage may occur the first 2 days postop.  You may remove the dressings and get the incision wet in the shower 72 hours after surgery.  DO NOT remove steri-strips or sutures.  DO NOT immerse the incision under water.  Carefully pat the incision dry.  If there is wound drainage, re-apply a fresh dry gauze dressing.  Please call our office ([phone]) if you experience either of the following:  Sudden increase in swelling, redness, or warmth at the surgical site  Excessive incisional drainage that persists beyond the 3rd day after surgery  Oral temperature greater than 101 degrees, not relieved with Tylenol  Shortness of breath, chest pain, nausea, or any other concerning symptoms    SWELLING CONTROL:  Cold Therapy:  The cold therapy device may be used either continuously or only as needed, according to your preference.  Do not let the pad directly touch your skin.  Alternatively, apply ice (20 min on, 20 min off) as often as you feel is  necessary.    SLING:  Wear your sling AT ALL TIMES (including sleep) until your first postoperative office visit.  You may remove the sling for showering but must keep your arm at your side.    ACTIVITY:   DO NOT lift, carry, push, or pull anything with your operative arm.  Shoulder:  Begin small, gentle circular motions (pendulums) with the arm as tolerated.  30 times clockwise and counterclockwise, 3 times per day.  Place a pillow behind the elbow while lying down.  Sleeping in a more upright position (recliner) may be more comfortable initially.  Wrist / Finger Motion:  With the sling on, move your wrist and fingers through a full range of motion 20 times per hour while awake.    PHYSICAL THERAPY:  Begin therapy 3 TO 5 DAYS AFTER SURGERY.  You were given a prescription for therapy at your preoperative office visit.  If you do not have physical therapy scheduled yet, please call our office for assistance.    FOLLOW-UP APPOINTMENT:  7-10 days after surgery with:    NNEKA RodriguezO.  [facility]  [address] [place] [address], [address], [place] [address]  [phone]
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
## 2025-06-02 ENCOUNTER — OFFICE VISIT (OUTPATIENT)
Dept: FAMILY MEDICINE CLINIC | Facility: CLINIC | Age: 55
End: 2025-06-02
Payer: COMMERCIAL

## 2025-06-02 VITALS
BODY MASS INDEX: 20 KG/M2 | DIASTOLIC BLOOD PRESSURE: 68 MMHG | HEIGHT: 68 IN | SYSTOLIC BLOOD PRESSURE: 128 MMHG | WEIGHT: 132 LBS | TEMPERATURE: 97 F | HEART RATE: 67 BPM | OXYGEN SATURATION: 99 %

## 2025-06-02 DIAGNOSIS — Q38.6 LONG UVULA: Primary | ICD-10-CM

## 2025-06-02 PROCEDURE — 99213 OFFICE O/P EST LOW 20 MIN: CPT | Performed by: FAMILY MEDICINE

## 2025-06-02 NOTE — PROGRESS NOTES
Name: Kathy Aden      : 1970      MRN: [id number]  Encounter Provider: Chantelle Blood DO  Encounter Date: 2025   Encounter department: [facility]  :  Assessment & Plan  Long uvula    Patient is counseled that sore throat following intubation for surgical procedures is common within 24 to 36 hours of the surgery.  Patient is counseled regarding over-the-counter pain relief, gargling with warm salt water, and keeping well-hydrated.  Patient is counseled that self resolution of these types of injuries typically will occur within 2 weeks.  Referral is provided to ENT should symptoms persist over the next 1 to 2 weeks.  May follow-up as needed.    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  She is status post arthroscopic biceps tendonesis under general anesthesia on 2025.  Patient notes that she feels that her uvula is now swollen and elongated and notes erythema at its tip.  She notes having the sensation of a bit of food stuck in that area but is unsure if this is the uvula touching her tongue or throat.  She denies any difficulty with speaking, eating, drinking aside from abnormal sensation.  Aside from abnormal sensation.      Review of Systems   Constitutional:  Negative for fatigue and fever.   HENT:  Negative for congestion and sore throat.    Respiratory:  Negative for cough and shortness of breath.    Cardiovascular:  Negative for chest pain and palpitations.   Gastrointestinal:  Negative for abdominal pain, blood in stool, constipation, diarrhea, nausea and vomiting.   Genitourinary:  Negative for dysuria and hematuria.   Musculoskeletal:  Negative for arthralgias and myalgias.   Skin:  Negative for rash.   Neurological:  Negative for dizziness and headaches.   Psychiatric/Behavioral:  Negative for  "dysphoric mood. The patient is not nervous/anxious.        Objective   /68   Pulse 67   Temp (!) 97 °F (36.1 °C)   Ht 5' 8\" (1.727 m)   Wt 59.9 kg (132 lb)   LMP 08/13/2020 (Exact Date)   SpO2 99%   BMI 20.07 kg/m²      Physical Exam  Vitals reviewed.   Constitutional:       General: She is not in acute distress.     Appearance: She is well-developed.   HENT:      Head: Normocephalic and atraumatic.      Right Ear: External ear normal.      Left Ear: External ear normal.      Mouth/Throat:      Mouth: Mucous membranes are moist.      Pharynx: Oropharynx is clear. No oropharyngeal exudate or posterior oropharyngeal erythema.      Comments: Uvula does appear somewhat elongated on exam though I am unfamiliar with the patient's baseline.    Eyes:      Conjunctiva/sclera: Conjunctivae normal.       Cardiovascular:      Rate and Rhythm: Normal rate and regular rhythm.      Heart sounds: No murmur heard.  Pulmonary:      Effort: Pulmonary effort is normal. No respiratory distress.      Breath sounds: Normal breath sounds.   Abdominal:      General: Bowel sounds are normal.      Palpations: Abdomen is soft.      Tenderness: There is no abdominal tenderness.     Musculoskeletal:      Right lower leg: No edema.      Left lower leg: No edema.     Skin:     General: Skin is warm and dry.     Neurological:      General: No focal deficit present.      Mental Status: She is alert and oriented to person, place, and time.     Psychiatric:         Mood and Affect: Mood normal.         Behavior: Behavior normal.         "

## 2025-06-04 ENCOUNTER — OFFICE VISIT (OUTPATIENT)
Dept: OBGYN CLINIC | Facility: MEDICAL CENTER | Age: 55
End: 2025-06-04

## 2025-06-04 VITALS — HEIGHT: 68 IN | BODY MASS INDEX: 20 KG/M2 | WEIGHT: 132 LBS

## 2025-06-04 DIAGNOSIS — Z98.890 S/P SHOULDER SURGERY: Primary | ICD-10-CM

## 2025-06-04 PROCEDURE — 99024 POSTOP FOLLOW-UP VISIT: CPT | Performed by: PHYSICIAN ASSISTANT

## 2025-06-04 NOTE — PROGRESS NOTES
Orthopedics Sports Medicine Shoulder Post Operative Visit    Name: Kathy Aden      : 1970      MRN: [id number]  Encounter Provider: Stephanie Gregg PA-C  Encounter Date: 2025   Encounter [facility]  :  Assessment & Plan  S/P shoulder surgery  Assesment:     54 y.o. female 1 week s/p Surgical Arthroscopy of the right shoulder with arthroscopic biceps tenodesis for SLAP, DOS: 25, uvula injury being followed by ENT, likely result of intubation    Plan:    Post-Operative treatment:    Ice to shoulder 1-2 times daily, for 20 minutes at a time.  PT per protocol  Continue to obtain full passive motion to avoid stiffness  Analgesics as needed for pain   Steri-strips were changed in the office today. No incisional concerns. New steri-strips applied. Instructed that they may shower, allowing the warm soapy water to run over the incision and pat dry. If steri-strips fall off on their own, that is fine, if they are still in place in 1 week they are to remove the steri-strips. No soaking, baths, or tubs at this time. No creams ointments or lotions for an additional 3 weeks. Start scar massage in 3-4 weeks.  Will continue to follow with ENT regarding her uvula      Imaging:    All imaging from today was reviewed by myself and explained to the patient.     Sling:  at all times other than showering, FIOR sling post-op week 4     DVT Prophylaxis:  Ambulation    Follow up:   6 weeks     Patient was advised that if they have any fevers, chills, chest pain, shortness of breath, redness or drainage from the incision, please let our office know immediately.                 History of Present Illness   HPI  Chief Complaint   Patient presents with    Right Shoulder - Post-op     Pt present PO . Pt notes improvement. Pain w/ activity. PT  2x/wk. Compliant w/ sling       History of Present Illness:    The patient is a 54 y.o. female who is being evaluated  "post operatively 1 week s/p Surgical Arthroscopy of the right shoulder with arthroscopic biceps tenodesis for SLAP, DOS: 5/29/25.    Since the prior visit, She reports significant improvement.  Patient does note improvement of her right shoulder pain.  Patient reports that nerve block lasted till 6-20 25.  Patient notes that her arm was numb up until that point.  Patient states that all of her numbness about the arm has completely resolved.  Patient did start physical therapy on Monday.  Patient notes that therapy is going well thus far.  Patient is utilizing her sling as instructed..    Patient does note that she did sustain a uvula injury during intubation during the surgery.  Patient notes that her uvula is longer than it was previously and states that she can feel it touching the back of her throat.  Patient notes that she did follow-up with her PCP and then was referred to ENT regarding this.  Currently ENT is going to follow her symptoms but may have to perform partial ectomy in the future if necessary.    Pain is well controlled.  The patient is using ice to control swelling.    They have started physical therapy.     The patient is ambulating for DVT ppx.    The patient is using their sling at all times other than showering    The patient denies any fevers, chills, calf pain, chest pain/shortness of breath, redness or drainage from the incision.         I have reviewed the past medical, surgical, social and family history, medications and allergies as documented in the EMR.    Review of systems: ROS is negative other than that noted in the HPI.  Constitutional: Negative for fatigue and fever.     Objective   Ht 5' 8\" (1.727 m)   Wt 59.9 kg (132 lb)   LMP 08/13/2020 (Exact Date)   BMI 20.07 kg/m²     Physical Exam:    Height 5' 8\" (1.727 m), weight 59.9 kg (132 lb), last menstrual period 08/13/2020.    General/Constitutional: NAD, well developed, well nourished  HENT: Normocephalic, atraumatic  CV: Intact " distal pulses, regular rate  Resp: No respiratory distress or labored breathing  GI: Soft and non-tender   Lymphatic: No lymphadenopathy palpated  Neuro: Alert and Oriented x 3, no focal deficits  Psych: Normal mood, normal affect, normal judgement, normal behavior  Skin: Warm, dry, no rashes, no erythema    Shoulder focused exam:       RIGHT LEFT    Scapula Atrophy Negative Negative     Winging Negative Negative     Protraction Negative Negative    Rotator cuff SS Deferred due to post op period 5/5     IS Deferred due to post op period 5/5     SubS Deferred due to post op period 5/5    ROM  passive 170     ER0 30 passive 60                   IRb Deferred due to post op period T6    TTP:  None None    Stability:  stable stable      Incisions show no erythema, no drainage      UE NV Exam: +2 Radial pulses bilaterally  Sensation intact to light touch C5-T1 bilaterally, Radial/median/ulnar nerve motor intact

## (undated) DEVICE — PROBE ABLATION  APOLLO RF 90 DEG MULTI PORT

## (undated) DEVICE — MAT ABSORBANT ARTHROSCOPY FLOOR 46 X 40 IN

## (undated) DEVICE — 3M™ STERI-STRIP™ COMPOUND BENZOIN TINCTURE 40 BAGS/CARTON 4 CARTONS/CASE C1544: Brand: 3M™ STERI-STRIP™

## (undated) DEVICE — SUT PDS II 0 CT-1 36 IN Z346H

## (undated) DEVICE — GLOVE SRG BIOGEL 6.5

## (undated) DEVICE — 3M™ STERI-STRIP™ REINFORCED ADHESIVE SKIN CLOSURES, R1542, 1/4 IN X 1-1/2 IN (6 MM X 38 MM), 6 STRIPS/ENVELOPE: Brand: 3M™ STERI-STRIP™

## (undated) DEVICE — T-MAX DISPOSABLE FACE MASK 8 PER BOX

## (undated) DEVICE — GLOVE SRG BIOGEL 7.5

## (undated) DEVICE — BLADE SHAVER DISSECTOR 4MM 13CM COOLCUT

## (undated) DEVICE — PASSER SUT CRESCENT HIP LENGTH SWIFTSTITCH

## (undated) DEVICE — SMOKE EVAC FLUID SUCTION HEPA FILTER

## (undated) DEVICE — NEEDLE 18 G X 1 1/2

## (undated) DEVICE — TUBING SUCTION 5MM X 12 FT

## (undated) DEVICE — TUBING ARTHROSCOPY DUALWAVE OUTFLOW TUBE SET

## (undated) DEVICE — STIRRUP STRAP ADULT DISP

## (undated) DEVICE — TUBING ARTHROSCOPIC WAVE  MAIN PUMP

## (undated) DEVICE — 10FR FRAZIER SUCTION HANDLE: Brand: CARDINAL HEALTH

## (undated) DEVICE — 3M™ STERI-DRAPE™ U-DRAPE 1015: Brand: STERI-DRAPE™

## (undated) DEVICE — 3M™ STERI-STRIP™ REINFORCED ADHESIVE SKIN CLOSURES, R1547, 1/2 IN X 4 IN (12 MM X 100 MM), 6 STRIPS/ENVELOPE: Brand: 3M™ STERI-STRIP™

## (undated) DEVICE — KIT DISP F/2.9MM SHORT PUSHLOCK

## (undated) DEVICE — LIGHT HANDLE COVER SLEEVE DISP BLUE STELLAR

## (undated) DEVICE — SUT MONOCRYL 4-0 PS-2 27 IN Y426H

## (undated) DEVICE — GLOVE INDICATOR PI UNDERGLOVE SZ 7 BLUE

## (undated) DEVICE — GLOVE SRG BIOGEL 8.5

## (undated) DEVICE — STERILE POLYISOPRENE POWDER-FREE SURGICAL GLOVES WITH EMOLLIENT COATING: Brand: PROTEXIS

## (undated) DEVICE — 2000CC GUARDIAN II: Brand: GUARDIAN

## (undated) DEVICE — PREP PAD BNS: Brand: CONVERTORS

## (undated) DEVICE — GAUZE SPONGES,16 PLY: Brand: CURITY

## (undated) DEVICE — PROBE ABLATION  APOLLO RF ASPIRING 90 DEG

## (undated) DEVICE — CHLORAPREP HI-LITE 26ML ORANGE

## (undated) DEVICE — INTENDED FOR TISSUE SEPARATION, AND OTHER PROCEDURES THAT REQUIRE A SHARP SURGICAL BLADE TO PUNCTURE OR CUT.: Brand: BARD-PARKER ® CARBON RIB-BACK BLADES

## (undated) DEVICE — PUDDLE VAC

## (undated) DEVICE — PACK PBDS SHOULDER ARTHROSCOPY RF

## (undated) DEVICE — PENCIL ELECTROSURG E-Z CLEAN -0035H

## (undated) DEVICE — SUT VICRYL 4-0 PS-2 27 IN J426H

## (undated) DEVICE — DRAPE EQUIPMENT RF WAND

## (undated) DEVICE — GLOVE INDICATOR PI UNDERGLOVE SZ 7.5 BLUE

## (undated) DEVICE — CAST PADDING 4 IN SYNTHETIC NON-STRL

## (undated) DEVICE — NEEDLE 25G X 1 1/2

## (undated) DEVICE — SYRINGE 10ML LL

## (undated) DEVICE — CANNULA BUTTON 8 X 30MM PASSPORT

## (undated) DEVICE — CURITY STRETCH BANDAGE: Brand: CURITY

## (undated) DEVICE — ACE WRAP 4 IN UNSTERILE

## (undated) DEVICE — SUT VICRYL 3-0 PS-2 27 IN J427H

## (undated) DEVICE — KIT STABILIZATION SHOULDER MARCO

## (undated) DEVICE — CURITY NON-ADHERENT STRIPS: Brand: CURITY

## (undated) DEVICE — COBAN 4 IN STERILE

## (undated) DEVICE — BLADE SAGITTAL 25.6 X 9.5MM

## (undated) DEVICE — CANNULA 5.75 X 70MM BARREL SHAPED BOWL

## (undated) DEVICE — BETHLEHEM UNIVERSAL  MIONR EXT: Brand: CARDINAL HEALTH